# Patient Record
Sex: FEMALE | Race: ASIAN | NOT HISPANIC OR LATINO
[De-identification: names, ages, dates, MRNs, and addresses within clinical notes are randomized per-mention and may not be internally consistent; named-entity substitution may affect disease eponyms.]

---

## 2020-08-19 PROBLEM — Z00.00 ENCOUNTER FOR PREVENTIVE HEALTH EXAMINATION: Status: ACTIVE | Noted: 2020-08-19

## 2020-10-01 ENCOUNTER — NON-APPOINTMENT (OUTPATIENT)
Age: 55
End: 2020-10-01

## 2023-10-25 ENCOUNTER — INPATIENT (INPATIENT)
Facility: HOSPITAL | Age: 58
LOS: 1 days | Discharge: HOME | DRG: 303 | End: 2023-10-27
Attending: HOSPITALIST | Admitting: HOSPITALIST
Payer: COMMERCIAL

## 2023-10-25 VITALS
TEMPERATURE: 98 F | HEART RATE: 69 BPM | WEIGHT: 160.06 LBS | DIASTOLIC BLOOD PRESSURE: 80 MMHG | HEIGHT: 61 IN | OXYGEN SATURATION: 97 % | RESPIRATION RATE: 18 BRPM | SYSTOLIC BLOOD PRESSURE: 155 MMHG

## 2023-10-25 DIAGNOSIS — R94.31 ABNORMAL ELECTROCARDIOGRAM [ECG] [EKG]: ICD-10-CM

## 2023-10-25 DIAGNOSIS — E78.5 HYPERLIPIDEMIA, UNSPECIFIED: ICD-10-CM

## 2023-10-25 DIAGNOSIS — Z29.9 ENCOUNTER FOR PROPHYLACTIC MEASURES, UNSPECIFIED: ICD-10-CM

## 2023-10-25 DIAGNOSIS — R42 DIZZINESS AND GIDDINESS: ICD-10-CM

## 2023-10-25 DIAGNOSIS — E04.1 NONTOXIC SINGLE THYROID NODULE: ICD-10-CM

## 2023-10-25 DIAGNOSIS — R07.89 OTHER CHEST PAIN: ICD-10-CM

## 2023-10-25 LAB
A1C WITH ESTIMATED AVERAGE GLUCOSE RESULT: 5.5 % — SIGNIFICANT CHANGE UP (ref 4–5.6)
A1C WITH ESTIMATED AVERAGE GLUCOSE RESULT: 5.5 % — SIGNIFICANT CHANGE UP (ref 4–5.6)
ALBUMIN SERPL ELPH-MCNC: 4.4 G/DL — SIGNIFICANT CHANGE UP (ref 3.3–5)
ALBUMIN SERPL ELPH-MCNC: 4.4 G/DL — SIGNIFICANT CHANGE UP (ref 3.3–5)
ALP SERPL-CCNC: 67 U/L — SIGNIFICANT CHANGE UP (ref 40–120)
ALP SERPL-CCNC: 67 U/L — SIGNIFICANT CHANGE UP (ref 40–120)
ALT FLD-CCNC: 15 U/L — SIGNIFICANT CHANGE UP (ref 10–45)
ALT FLD-CCNC: 15 U/L — SIGNIFICANT CHANGE UP (ref 10–45)
ANION GAP SERPL CALC-SCNC: 12 MMOL/L — SIGNIFICANT CHANGE UP (ref 5–17)
ANION GAP SERPL CALC-SCNC: 12 MMOL/L — SIGNIFICANT CHANGE UP (ref 5–17)
APTT BLD: 31.1 SEC — SIGNIFICANT CHANGE UP (ref 24.5–35.6)
APTT BLD: 31.1 SEC — SIGNIFICANT CHANGE UP (ref 24.5–35.6)
AST SERPL-CCNC: 19 U/L — SIGNIFICANT CHANGE UP (ref 10–40)
AST SERPL-CCNC: 19 U/L — SIGNIFICANT CHANGE UP (ref 10–40)
BASE EXCESS BLDV CALC-SCNC: -0.2 MMOL/L — SIGNIFICANT CHANGE UP (ref -2–3)
BASE EXCESS BLDV CALC-SCNC: -0.2 MMOL/L — SIGNIFICANT CHANGE UP (ref -2–3)
BASOPHILS # BLD AUTO: 0 K/UL — SIGNIFICANT CHANGE UP (ref 0–0.2)
BASOPHILS # BLD AUTO: 0 K/UL — SIGNIFICANT CHANGE UP (ref 0–0.2)
BASOPHILS NFR BLD AUTO: 0 % — SIGNIFICANT CHANGE UP (ref 0–2)
BASOPHILS NFR BLD AUTO: 0 % — SIGNIFICANT CHANGE UP (ref 0–2)
BILIRUB SERPL-MCNC: 0.3 MG/DL — SIGNIFICANT CHANGE UP (ref 0.2–1.2)
BILIRUB SERPL-MCNC: 0.3 MG/DL — SIGNIFICANT CHANGE UP (ref 0.2–1.2)
BUN SERPL-MCNC: 14 MG/DL — SIGNIFICANT CHANGE UP (ref 7–23)
BUN SERPL-MCNC: 14 MG/DL — SIGNIFICANT CHANGE UP (ref 7–23)
CA-I SERPL-SCNC: 1.19 MMOL/L — SIGNIFICANT CHANGE UP (ref 1.15–1.33)
CA-I SERPL-SCNC: 1.19 MMOL/L — SIGNIFICANT CHANGE UP (ref 1.15–1.33)
CALCIUM SERPL-MCNC: 9.5 MG/DL — SIGNIFICANT CHANGE UP (ref 8.4–10.5)
CALCIUM SERPL-MCNC: 9.5 MG/DL — SIGNIFICANT CHANGE UP (ref 8.4–10.5)
CHLORIDE BLDV-SCNC: 105 MMOL/L — SIGNIFICANT CHANGE UP (ref 96–108)
CHLORIDE BLDV-SCNC: 105 MMOL/L — SIGNIFICANT CHANGE UP (ref 96–108)
CHLORIDE SERPL-SCNC: 105 MMOL/L — SIGNIFICANT CHANGE UP (ref 96–108)
CHLORIDE SERPL-SCNC: 105 MMOL/L — SIGNIFICANT CHANGE UP (ref 96–108)
CHOLEST SERPL-MCNC: 149 MG/DL — SIGNIFICANT CHANGE UP
CHOLEST SERPL-MCNC: 149 MG/DL — SIGNIFICANT CHANGE UP
CO2 BLDV-SCNC: 28 MMOL/L — HIGH (ref 22–26)
CO2 BLDV-SCNC: 28 MMOL/L — HIGH (ref 22–26)
CO2 SERPL-SCNC: 23 MMOL/L — SIGNIFICANT CHANGE UP (ref 22–31)
CO2 SERPL-SCNC: 23 MMOL/L — SIGNIFICANT CHANGE UP (ref 22–31)
CREAT SERPL-MCNC: 0.56 MG/DL — SIGNIFICANT CHANGE UP (ref 0.5–1.3)
CREAT SERPL-MCNC: 0.56 MG/DL — SIGNIFICANT CHANGE UP (ref 0.5–1.3)
EGFR: 104 ML/MIN/1.73M2 — SIGNIFICANT CHANGE UP
EGFR: 104 ML/MIN/1.73M2 — SIGNIFICANT CHANGE UP
EOSINOPHIL # BLD AUTO: 0.09 K/UL — SIGNIFICANT CHANGE UP (ref 0–0.5)
EOSINOPHIL # BLD AUTO: 0.09 K/UL — SIGNIFICANT CHANGE UP (ref 0–0.5)
EOSINOPHIL NFR BLD AUTO: 1 % — SIGNIFICANT CHANGE UP (ref 0–6)
EOSINOPHIL NFR BLD AUTO: 1 % — SIGNIFICANT CHANGE UP (ref 0–6)
ESTIMATED AVERAGE GLUCOSE: 111 MG/DL — SIGNIFICANT CHANGE UP (ref 68–114)
ESTIMATED AVERAGE GLUCOSE: 111 MG/DL — SIGNIFICANT CHANGE UP (ref 68–114)
GAS PNL BLDV: 136 MMOL/L — SIGNIFICANT CHANGE UP (ref 136–145)
GAS PNL BLDV: 136 MMOL/L — SIGNIFICANT CHANGE UP (ref 136–145)
GAS PNL BLDV: SIGNIFICANT CHANGE UP
GLUCOSE BLDV-MCNC: 171 MG/DL — HIGH (ref 70–99)
GLUCOSE BLDV-MCNC: 171 MG/DL — HIGH (ref 70–99)
GLUCOSE SERPL-MCNC: 171 MG/DL — HIGH (ref 70–99)
GLUCOSE SERPL-MCNC: 171 MG/DL — HIGH (ref 70–99)
HCO3 BLDV-SCNC: 26 MMOL/L — SIGNIFICANT CHANGE UP (ref 22–29)
HCO3 BLDV-SCNC: 26 MMOL/L — SIGNIFICANT CHANGE UP (ref 22–29)
HCT VFR BLD CALC: 37.4 % — SIGNIFICANT CHANGE UP (ref 34.5–45)
HCT VFR BLD CALC: 37.4 % — SIGNIFICANT CHANGE UP (ref 34.5–45)
HCT VFR BLDA CALC: 38 % — SIGNIFICANT CHANGE UP (ref 34.5–46.5)
HCT VFR BLDA CALC: 38 % — SIGNIFICANT CHANGE UP (ref 34.5–46.5)
HDLC SERPL-MCNC: 31 MG/DL — LOW
HDLC SERPL-MCNC: 31 MG/DL — LOW
HGB BLD CALC-MCNC: 12.6 G/DL — SIGNIFICANT CHANGE UP (ref 11.7–16.1)
HGB BLD CALC-MCNC: 12.6 G/DL — SIGNIFICANT CHANGE UP (ref 11.7–16.1)
HGB BLD-MCNC: 12.1 G/DL — SIGNIFICANT CHANGE UP (ref 11.5–15.5)
HGB BLD-MCNC: 12.1 G/DL — SIGNIFICANT CHANGE UP (ref 11.5–15.5)
INR BLD: 1.16 RATIO — SIGNIFICANT CHANGE UP (ref 0.85–1.18)
INR BLD: 1.16 RATIO — SIGNIFICANT CHANGE UP (ref 0.85–1.18)
LACTATE BLDV-MCNC: 0.8 MMOL/L — SIGNIFICANT CHANGE UP (ref 0.5–2)
LACTATE BLDV-MCNC: 0.8 MMOL/L — SIGNIFICANT CHANGE UP (ref 0.5–2)
LIPID PNL WITH DIRECT LDL SERPL: 99 MG/DL — SIGNIFICANT CHANGE UP
LIPID PNL WITH DIRECT LDL SERPL: 99 MG/DL — SIGNIFICANT CHANGE UP
LYMPHOCYTES # BLD AUTO: 1.83 K/UL — SIGNIFICANT CHANGE UP (ref 1–3.3)
LYMPHOCYTES # BLD AUTO: 1.83 K/UL — SIGNIFICANT CHANGE UP (ref 1–3.3)
LYMPHOCYTES # BLD AUTO: 20 % — SIGNIFICANT CHANGE UP (ref 13–44)
LYMPHOCYTES # BLD AUTO: 20 % — SIGNIFICANT CHANGE UP (ref 13–44)
MAGNESIUM SERPL-MCNC: 2 MG/DL — SIGNIFICANT CHANGE UP (ref 1.6–2.6)
MAGNESIUM SERPL-MCNC: 2 MG/DL — SIGNIFICANT CHANGE UP (ref 1.6–2.6)
MANUAL SMEAR VERIFICATION: SIGNIFICANT CHANGE UP
MANUAL SMEAR VERIFICATION: SIGNIFICANT CHANGE UP
MCHC RBC-ENTMCNC: 30.3 PG — SIGNIFICANT CHANGE UP (ref 27–34)
MCHC RBC-ENTMCNC: 30.3 PG — SIGNIFICANT CHANGE UP (ref 27–34)
MCHC RBC-ENTMCNC: 32.4 GM/DL — SIGNIFICANT CHANGE UP (ref 32–36)
MCHC RBC-ENTMCNC: 32.4 GM/DL — SIGNIFICANT CHANGE UP (ref 32–36)
MCV RBC AUTO: 93.5 FL — SIGNIFICANT CHANGE UP (ref 80–100)
MCV RBC AUTO: 93.5 FL — SIGNIFICANT CHANGE UP (ref 80–100)
MONOCYTES # BLD AUTO: 0.18 K/UL — SIGNIFICANT CHANGE UP (ref 0–0.9)
MONOCYTES # BLD AUTO: 0.18 K/UL — SIGNIFICANT CHANGE UP (ref 0–0.9)
MONOCYTES NFR BLD AUTO: 2 % — SIGNIFICANT CHANGE UP (ref 2–14)
MONOCYTES NFR BLD AUTO: 2 % — SIGNIFICANT CHANGE UP (ref 2–14)
NEUTROPHILS # BLD AUTO: 7.05 K/UL — SIGNIFICANT CHANGE UP (ref 1.8–7.4)
NEUTROPHILS # BLD AUTO: 7.05 K/UL — SIGNIFICANT CHANGE UP (ref 1.8–7.4)
NEUTROPHILS NFR BLD AUTO: 77 % — SIGNIFICANT CHANGE UP (ref 43–77)
NEUTROPHILS NFR BLD AUTO: 77 % — SIGNIFICANT CHANGE UP (ref 43–77)
NON HDL CHOLESTEROL: 118 MG/DL — SIGNIFICANT CHANGE UP
NON HDL CHOLESTEROL: 118 MG/DL — SIGNIFICANT CHANGE UP
NRBC # BLD: 0 /100 — SIGNIFICANT CHANGE UP (ref 0–0)
NRBC # BLD: 0 /100 — SIGNIFICANT CHANGE UP (ref 0–0)
PCO2 BLDV: 50 MMHG — HIGH (ref 39–42)
PCO2 BLDV: 50 MMHG — HIGH (ref 39–42)
PH BLDV: 7.33 — SIGNIFICANT CHANGE UP (ref 7.32–7.43)
PH BLDV: 7.33 — SIGNIFICANT CHANGE UP (ref 7.32–7.43)
PHOSPHATE SERPL-MCNC: 2.9 MG/DL — SIGNIFICANT CHANGE UP (ref 2.5–4.5)
PHOSPHATE SERPL-MCNC: 2.9 MG/DL — SIGNIFICANT CHANGE UP (ref 2.5–4.5)
PLAT MORPH BLD: NORMAL — SIGNIFICANT CHANGE UP
PLAT MORPH BLD: NORMAL — SIGNIFICANT CHANGE UP
PLATELET # BLD AUTO: 188 K/UL — SIGNIFICANT CHANGE UP (ref 150–400)
PLATELET # BLD AUTO: 188 K/UL — SIGNIFICANT CHANGE UP (ref 150–400)
PO2 BLDV: 37 MMHG — SIGNIFICANT CHANGE UP (ref 25–45)
PO2 BLDV: 37 MMHG — SIGNIFICANT CHANGE UP (ref 25–45)
POTASSIUM BLDV-SCNC: 3.5 MMOL/L — SIGNIFICANT CHANGE UP (ref 3.5–5.1)
POTASSIUM BLDV-SCNC: 3.5 MMOL/L — SIGNIFICANT CHANGE UP (ref 3.5–5.1)
POTASSIUM SERPL-MCNC: 3.6 MMOL/L — SIGNIFICANT CHANGE UP (ref 3.5–5.3)
POTASSIUM SERPL-MCNC: 3.6 MMOL/L — SIGNIFICANT CHANGE UP (ref 3.5–5.3)
POTASSIUM SERPL-SCNC: 3.6 MMOL/L — SIGNIFICANT CHANGE UP (ref 3.5–5.3)
POTASSIUM SERPL-SCNC: 3.6 MMOL/L — SIGNIFICANT CHANGE UP (ref 3.5–5.3)
PROT SERPL-MCNC: 7.3 G/DL — SIGNIFICANT CHANGE UP (ref 6–8.3)
PROT SERPL-MCNC: 7.3 G/DL — SIGNIFICANT CHANGE UP (ref 6–8.3)
PROTHROM AB SERPL-ACNC: 12.1 SEC — SIGNIFICANT CHANGE UP (ref 9.5–13)
PROTHROM AB SERPL-ACNC: 12.1 SEC — SIGNIFICANT CHANGE UP (ref 9.5–13)
RBC # BLD: 4 M/UL — SIGNIFICANT CHANGE UP (ref 3.8–5.2)
RBC # BLD: 4 M/UL — SIGNIFICANT CHANGE UP (ref 3.8–5.2)
RBC # FLD: 14 % — SIGNIFICANT CHANGE UP (ref 10.3–14.5)
RBC # FLD: 14 % — SIGNIFICANT CHANGE UP (ref 10.3–14.5)
RBC BLD AUTO: NORMAL — SIGNIFICANT CHANGE UP
RBC BLD AUTO: NORMAL — SIGNIFICANT CHANGE UP
SAO2 % BLDV: 61.5 % — LOW (ref 67–88)
SAO2 % BLDV: 61.5 % — LOW (ref 67–88)
SODIUM SERPL-SCNC: 140 MMOL/L — SIGNIFICANT CHANGE UP (ref 135–145)
SODIUM SERPL-SCNC: 140 MMOL/L — SIGNIFICANT CHANGE UP (ref 135–145)
TRIGL SERPL-MCNC: 99 MG/DL — SIGNIFICANT CHANGE UP
TRIGL SERPL-MCNC: 99 MG/DL — SIGNIFICANT CHANGE UP
TROPONIN T, HIGH SENSITIVITY RESULT: 15 NG/L — SIGNIFICANT CHANGE UP (ref 0–51)
TROPONIN T, HIGH SENSITIVITY RESULT: 15 NG/L — SIGNIFICANT CHANGE UP (ref 0–51)
TROPONIN T, HIGH SENSITIVITY RESULT: 17 NG/L — SIGNIFICANT CHANGE UP (ref 0–51)
TROPONIN T, HIGH SENSITIVITY RESULT: 17 NG/L — SIGNIFICANT CHANGE UP (ref 0–51)
WBC # BLD: 9.16 K/UL — SIGNIFICANT CHANGE UP (ref 3.8–10.5)
WBC # BLD: 9.16 K/UL — SIGNIFICANT CHANGE UP (ref 3.8–10.5)
WBC # FLD AUTO: 9.16 K/UL — SIGNIFICANT CHANGE UP (ref 3.8–10.5)
WBC # FLD AUTO: 9.16 K/UL — SIGNIFICANT CHANGE UP (ref 3.8–10.5)

## 2023-10-25 PROCEDURE — 93306 TTE W/DOPPLER COMPLETE: CPT | Mod: 26

## 2023-10-25 PROCEDURE — 71045 X-RAY EXAM CHEST 1 VIEW: CPT | Mod: 26

## 2023-10-25 PROCEDURE — 70450 CT HEAD/BRAIN W/O DYE: CPT | Mod: 26,MA,59

## 2023-10-25 PROCEDURE — 76536 US EXAM OF HEAD AND NECK: CPT | Mod: 26

## 2023-10-25 PROCEDURE — 99223 1ST HOSP IP/OBS HIGH 75: CPT

## 2023-10-25 PROCEDURE — 70496 CT ANGIOGRAPHY HEAD: CPT | Mod: 26,MA

## 2023-10-25 PROCEDURE — 70498 CT ANGIOGRAPHY NECK: CPT | Mod: 26,MA

## 2023-10-25 PROCEDURE — 99285 EMERGENCY DEPT VISIT HI MDM: CPT | Mod: 25

## 2023-10-25 RX ORDER — CHOLECALCIFEROL (VITAMIN D3) 125 MCG
1 CAPSULE ORAL
Refills: 0 | DISCHARGE

## 2023-10-25 RX ORDER — ATORVASTATIN CALCIUM 80 MG/1
20 TABLET, FILM COATED ORAL AT BEDTIME
Refills: 0 | Status: DISCONTINUED | OUTPATIENT
Start: 2023-10-25 | End: 2023-10-27

## 2023-10-25 RX ORDER — MECLIZINE HCL 12.5 MG
12.5 TABLET ORAL ONCE
Refills: 0 | Status: COMPLETED | OUTPATIENT
Start: 2023-10-25 | End: 2023-10-25

## 2023-10-25 RX ORDER — ASPIRIN/CALCIUM CARB/MAGNESIUM 324 MG
1 TABLET ORAL
Refills: 0 | DISCHARGE

## 2023-10-25 RX ORDER — LANOLIN ALCOHOL/MO/W.PET/CERES
3 CREAM (GRAM) TOPICAL AT BEDTIME
Refills: 0 | Status: DISCONTINUED | OUTPATIENT
Start: 2023-10-25 | End: 2023-10-27

## 2023-10-25 RX ORDER — METOCLOPRAMIDE HCL 10 MG
10 TABLET ORAL ONCE
Refills: 0 | Status: COMPLETED | OUTPATIENT
Start: 2023-10-25 | End: 2023-10-25

## 2023-10-25 RX ORDER — NABUMETONE 750 MG
1 TABLET ORAL
Refills: 0 | DISCHARGE

## 2023-10-25 RX ORDER — ASPIRIN/CALCIUM CARB/MAGNESIUM 324 MG
81 TABLET ORAL DAILY
Refills: 0 | Status: DISCONTINUED | OUTPATIENT
Start: 2023-10-25 | End: 2023-10-27

## 2023-10-25 RX ORDER — MECLIZINE HCL 12.5 MG
12.5 TABLET ORAL EVERY 8 HOURS
Refills: 0 | Status: DISCONTINUED | OUTPATIENT
Start: 2023-10-25 | End: 2023-10-27

## 2023-10-25 RX ORDER — ACETAMINOPHEN 500 MG
650 TABLET ORAL EVERY 6 HOURS
Refills: 0 | Status: DISCONTINUED | OUTPATIENT
Start: 2023-10-25 | End: 2023-10-27

## 2023-10-25 RX ADMIN — Medication 12.5 MILLIGRAM(S): at 11:24

## 2023-10-25 RX ADMIN — ATORVASTATIN CALCIUM 20 MILLIGRAM(S): 80 TABLET, FILM COATED ORAL at 22:51

## 2023-10-25 RX ADMIN — Medication 81 MILLIGRAM(S): at 12:33

## 2023-10-25 RX ADMIN — Medication 10 MILLIGRAM(S): at 04:01

## 2023-10-25 NOTE — ED ADULT NURSE REASSESSMENT NOTE - NS ED NURSE REASSESS COMMENT FT1
PT on stretcher with family at bedside. Offered meclizine and breakfast, pt does not want at this time.  Vital signs stable.  Pt updated on plan of care.  Awaiting bed placement.

## 2023-10-25 NOTE — ED PROVIDER NOTE - OBJECTIVE STATEMENT
Patient is a 58 year-old-female with history of HTN and HLD presents as a code stroke for dizziness. Accompanied by daughter. Reports that the patient called the daughter at 1 in the AM and complained of dizziness with headache and vomited x2. Also c/o overall weakness. +chest tightness. Denies fever at home, abdominal pain, urinary or bowel complaints.

## 2023-10-25 NOTE — ED PROVIDER NOTE - PROGRESS NOTE DETAILS
Uday PGY3  Discussed with neuro - recommends medicine admission for further metabolic workup.   Reassessed patient and reports feeling better. TM clear bilaterally. Uday PGY3  Discussed with hospitalist - will admit for further cardiac workup given abnormal ekg and chest tightness on presentation.

## 2023-10-25 NOTE — ED ADULT NURSE NOTE - OBJECTIVE STATEMENT
58y Female presents to the ED c/o fatigue, vomiting, and inability to stand. PMH HLD. Pt is primarily Hebrew speaking, Pts daughter at bedside translating for Pt. Pts daughter states around 12AM her Mother began vomiting, having fatigue, and was unable to stand up. Pt called her daughter at 1AM explaining symptoms and Pt was brought to the ED for an evaluation. Pts daughter states her Mother was leaning more to the R side and "wasn't speaking properly". Pt is A&Ox4, and ambulatory at baseline. Respirations spontaneous, unlabored, and equal bilaterally. Patient safety maintained, bed is in lowest position, wheels locked, and side rails raised. Patient oriented to call bell, and call bell is within reach. 58y Female presents to the ED c/o fatigue, vomiting, and inability to stand. Code stroke called at 0323. PMH HLD. Pt is primarily Welsh speaking, Pts daughter at bedside translating for Pt. Pts daughter states around 12AM her Mother began vomiting, having fatigue, and was unable to stand up. Pt called her daughter at 1AM explaining symptoms and Pt was brought to the ED for an evaluation. Pts daughter states her Mother was leaning more to the R side and "wasn't speaking properly". Pt is A&Ox4, and ambulatory at baseline. WARNER, lungs clear, distal pulses intact, abdomen soft, skin intact. Side rails up for safety, call bell and personal items within reach, instructed to call for assistance, verbalizes understanding.

## 2023-10-25 NOTE — ED ADULT NURSE NOTE - CHIEF COMPLAINT QUOTE
dizziness, headache and vomiting x2 that started at 1130pm last night. took 2 aspirins before coming into the ED

## 2023-10-25 NOTE — CONSULT NOTE ADULT - ASSESSMENT
58y (1965) woman with a past medical history of DL presenting for acute onset dizziness, generalized weakness, headache, bilateral upper extremity burning radiating from the neck to the hands, all happen suddenly at about 9 pm on Oct 24/23. Her presentation was as well concomitant with nausea and vomiting. She otherwise denied any abdominal pain or diarrhea. She denied any spinning sensation. She denied any focal loss of sensation or focal weakness aside form the generalized weakness.     impression: generalized altered status without focality likely due to metabolic disturbance    plan:  [] admit the patient to medicine  [] perform full metabolic work up  [] no focal deficit  [] neurology to follow only if needed     58y (1965) woman with a past medical history of DL presenting for acute onset dizziness, generalized weakness, headache, bilateral upper extremity burning radiating from the neck to the hands, all happen suddenly at about 9 pm on Oct 24/23. Her presentation was as well concomitant with nausea and vomiting. She otherwise denied any abdominal pain or diarrhea. She denied any spinning sensation. She denied any focal loss of sensation or focal weakness aside form the generalized weakness.     impression: generalized altered status without focality likely due to metabolic disturbance    plan:  - metabolic workup     see attesstation for final recs

## 2023-10-25 NOTE — CONSULT NOTE ADULT - SUBJECTIVE AND OBJECTIVE BOX
DATE OF SERVICE: 10-25-23 @ 18:40    CHIEF COMPLAINT:Patient is a 58y old  Female who presents with a chief complaint of dizziness (25 Oct 2023 12:59)      HISTORY OF PRESENT ILLNESS:HPI:  58 Yoruba female with carpal tunnel syndrome, HLD, no prior hx of stroke or MI here for 1 day history of dizziness, with nausea and 3 episodes of emesis associated with chest tightness and weakness. She describes chronic episodic bilateral hand pain/cramping that preceded the nausea. In the ED, pt was called for code stroke, with negative CT angio and CTH findings. Her symptoms largely resolved after meclizine but still with some bilateral hand weakness/numbness.        PAST MEDICAL & SURGICAL HISTORY:  HLD (hyperlipidemia)      No significant past surgical history      MEDICATIONS:  aspirin enteric coated 81 milliGRAM(s) Oral daily        acetaminophen     Tablet .. 650 milliGRAM(s) Oral every 6 hours PRN  meclizine 12.5 milliGRAM(s) Oral every 8 hours PRN  melatonin 3 milliGRAM(s) Oral at bedtime PRN      atorvastatin 20 milliGRAM(s) Oral at bedtime        FAMILY HISTORY:  No pertinent family history in first degree relatives        Non-contributory    SOCIAL HISTORY:    No Tobacco use      Allergies    No Known Allergies    Intolerances    	    REVIEW OF SYSTEMS:  CONSTITUTIONAL: No fever  EYES: No eye pain, visual disturbances, or discharge  ENMT:  No difficulty hearing, tinnitus  NECK: No pain or stiffness  RESPIRATORY: No cough, wheezing,  CARDIOVASCULAR: No chest pain, palpitations, passing out, dizziness, or leg swelling  GASTROINTESTINAL:  No nausea, vomiting, diarrhea or constipation. No melena.  GENITOURINARY: No dysuria, hematuria  NEUROLOGICAL: No stroke like symptoms  SKIN: No burning or lesions   ENDOCRINE: No heat or cold intolerance  MUSCULOSKELETAL: No joint pain or swelling  PSYCHIATRIC: No  anxiety, mood swings  HEME/LYMPH: No bleeding gums  ALLERGY AND IMMUNOLOGIC: No hives or eczema	    All other ROS negative    PHYSICAL EXAM:  T(C): 36.6 (10-25-23 @ 16:10), Max: 36.7 (10-25-23 @ 04:42)  HR: 64 (10-25-23 @ 16:10) (63 - 70)  BP: 141/88 (10-25-23 @ 16:10) (114/78 - 155/80)  RR: 18 (10-25-23 @ 16:10) (17 - 20)  SpO2: 94% (10-25-23 @ 16:10) (94% - 99%)  Wt(kg): --  I&O's Summary      Appearance: Normal	  HEENT:   Normal oral mucosa, EOMI	  Cardiovascular:  S1 S2, No JVD,    Respiratory: Lungs clear to auscultation	  Psychiatry: Alert  Gastrointestinal:  Soft, Non-tender, + BS	  Skin: No rashes   Neurologic: Non-focal  Extremities:  No edema  Vascular: Peripheral pulses palpable    	    	  	  CARDIAC MARKERS:  Labs personally reviewed by me                                  12.1   9.16  )-----------( 188      ( 25 Oct 2023 03:36 )             37.4     10-25    140  |  105  |  14  ----------------------------<  171<H>  3.6   |  23  |  0.56    Ca    9.5      25 Oct 2023 03:36  Phos  2.9     10-25  Mg     2.0     10-25    TPro  7.3  /  Alb  4.4  /  TBili  0.3  /  DBili  x   /  AST  19  /  ALT  15  /  AlkPhos  67  10-25          EKG: Personally reviewed by me - no EKG found in EMR (HPI comments on diffused TWI pending review)   Radiology: Personally reviewed by me -  Clear lungs.    Echo 10/25/23   1. Left ventricular cavity is normal. Left ventricular wall thickness is normal. Left ventricular systolic function is normal with an ejection fraction of 75 % by Carlos's method of disks. There are no regional wall motion abnormalities seen.   2. Normal left ventricular diastolic function, with normal filling pressure.   3. Normal right ventricular cavity size, wall thickness, and systolic function.   4. No pericardial effusion seen.   5. No prior echocardiogram is available for comparison.      CT head  Patent intracranial circulation without flow limiting stenosis.  No evidence of aneurysm.        Assessment /Plan:     58 Yoruba female with carpal tunnel syndrome, HLD, no prior hx of stroke or MI here for 1 day history of dizziness, with nausea and 3 episodes of emesis associated with chest tightness and weakness. She describes chronic episodic bilateral hand pain/cramping that preceded the nausea. In the ED, pt was called for code stroke, with negative CT angio and CTH findings. Her symptoms largely resolved after meclizine but still with some bilateral hand weakness/numbness.      1 - Abnormal ECG  - Will review EKG, recommend repeat EKG   - Troponin wnl x2   - echo wnl  - recommend CTA Heart to further define coronary anatomy     2- Dizziness   - Neuro consult appreciated  - check orthostatic   - consider check carotid U/S   -CT head as above normal     3- HLD   - LDL 99 wnl   -c/w Atorvastatin 20mg          Differential diagnosis and plan of care discussed with patient after the evaluation. Counseling on diet, nutritional counseling, weight management, exercise and medication compliance was done.   Advanced care planning/advanced directives discussed with patient/family. DNR status including forceful chest compressions to attempt to restart the heart, ventilator support/artificial breathing, electric shock, artificial nutrition, health care proxy, Molst form all discussed with pt. Pt wishes to consider. More than fifteen minutes spent on discussing advanced directives.     JEFFREY Mercado, DO [facility]  Cardiovascular Medicine  [address], [address]  Available for call or text via [employer]  Office [phone]   DATE OF SERVICE: 10-25-23 @ 18:40    CHIEF COMPLAINT:Patient is a 58y old  Female who presents with a chief complaint of dizziness (25 Oct 2023 12:59)      HISTORY OF PRESENT ILLNESS:HPI:  58 Italian female with carpal tunnel syndrome, HLD, no prior hx of stroke or MI here for 1 day history of dizziness, with nausea and 3 episodes of emesis associated with chest tightness and weakness. She describes chronic episodic bilateral hand pain/cramping that preceded the nausea. In the ED, pt was called for code stroke, with negative CT angio and CTH findings. Her symptoms largely resolved after meclizine but still with some bilateral hand weakness/numbness.        PAST MEDICAL & SURGICAL HISTORY:  HLD (hyperlipidemia)      No significant past surgical history      MEDICATIONS:  aspirin enteric coated 81 milliGRAM(s) Oral daily        acetaminophen     Tablet .. 650 milliGRAM(s) Oral every 6 hours PRN  meclizine 12.5 milliGRAM(s) Oral every 8 hours PRN  melatonin 3 milliGRAM(s) Oral at bedtime PRN      atorvastatin 20 milliGRAM(s) Oral at bedtime        FAMILY HISTORY:  No pertinent family history in first degree relatives        Non-contributory    SOCIAL HISTORY:    No Tobacco use      Allergies    No Known Allergies    Intolerances    	    REVIEW OF SYSTEMS:  CONSTITUTIONAL: No fever  EYES: No eye pain, visual disturbances, or discharge  ENMT:  No difficulty hearing, tinnitus  NECK: No pain or stiffness  RESPIRATORY: No cough, wheezing,  CARDIOVASCULAR: No chest pain, palpitations, passing out, dizziness, or leg swelling  GASTROINTESTINAL:  No nausea, vomiting, diarrhea or constipation. No melena.  GENITOURINARY: No dysuria, hematuria  NEUROLOGICAL: No stroke like symptoms  SKIN: No burning or lesions   ENDOCRINE: No heat or cold intolerance  MUSCULOSKELETAL: No joint pain or swelling  PSYCHIATRIC: No  anxiety, mood swings  HEME/LYMPH: No bleeding gums  ALLERGY AND IMMUNOLOGIC: No hives or eczema	    All other ROS negative    PHYSICAL EXAM:  T(C): 36.6 (10-25-23 @ 16:10), Max: 36.7 (10-25-23 @ 04:42)  HR: 64 (10-25-23 @ 16:10) (63 - 70)  BP: 141/88 (10-25-23 @ 16:10) (114/78 - 155/80)  RR: 18 (10-25-23 @ 16:10) (17 - 20)  SpO2: 94% (10-25-23 @ 16:10) (94% - 99%)  Wt(kg): --  I&O's Summary      Appearance: Normal	  HEENT:   Normal oral mucosa, EOMI	  Cardiovascular:  S1 S2, No JVD,    Respiratory: Lungs clear to auscultation	  Psychiatry: Alert  Gastrointestinal:  Soft, Non-tender, + BS	  Skin: No rashes   Neurologic: Non-focal  Extremities:  No edema  Vascular: Peripheral pulses palpable    	    	  	  CARDIAC MARKERS:  Labs personally reviewed by me                                  12.1   9.16  )-----------( 188      ( 25 Oct 2023 03:36 )             37.4     10-25    140  |  105  |  14  ----------------------------<  171<H>  3.6   |  23  |  0.56    Ca    9.5      25 Oct 2023 03:36  Phos  2.9     10-25  Mg     2.0     10-25    TPro  7.3  /  Alb  4.4  /  TBili  0.3  /  DBili  x   /  AST  19  /  ALT  15  /  AlkPhos  67  10-25          EKG: Personally reviewed by me - no EKG found in EMR (HPI comments on diffused TWI pending review)   Radiology: Personally reviewed by me -  Clear lungs.    Echo 10/25/23   1. Left ventricular cavity is normal. Left ventricular wall thickness is normal. Left ventricular systolic function is normal with an ejection fraction of 75 % by Carlos's method of disks. There are no regional wall motion abnormalities seen.   2. Normal left ventricular diastolic function, with normal filling pressure.   3. Normal right ventricular cavity size, wall thickness, and systolic function.   4. No pericardial effusion seen.   5. No prior echocardiogram is available for comparison.      CT head  Patent intracranial circulation without flow limiting stenosis.  No evidence of aneurysm.        Assessment /Plan:     58 Italian female with carpal tunnel syndrome, HLD, no prior hx of stroke or MI here for 1 day history of dizziness, with nausea and 3 episodes of emesis associated with chest tightness and weakness. She describes chronic episodic bilateral hand pain/cramping that preceded the nausea. In the ED, pt was called for code stroke, with negative CT angio and CTH findings. Her symptoms largely resolved after meclizine but still with some bilateral hand weakness/numbness.      1 Chest pain with Abnormal ECG  - EKG with ischemic changes  - Troponin wnl x2   - echo wnl  - recommend CTA Heart to further define coronary anatomy     2- Dizziness   - Neuro consult appreciated  - check orthostatic   - consider check carotid U/S   -CT head as above normal     3- HLD   - LDL 99 wnl   -c/w Atorvastatin 20mg        Discussed with daughter at bedside         Differential diagnosis and plan of care discussed with patient after the evaluation. Counseling on diet, nutritional counseling, weight management, exercise and medication compliance was done.   Advanced care planning/advanced directives discussed with patient/family. DNR status including forceful chest compressions to attempt to restart the heart, ventilator support/artificial breathing, electric shock, artificial nutrition, health care proxy, Molst form all discussed with pt. Pt wishes to consider. More than fifteen minutes spent on discussing advanced directives.     JEFFREY Mercado, DO [facility]  Cardiovascular Medicine  [address] [place] [address] Dr, [address]  Available for call or text via [employer]  Office [phone]

## 2023-10-25 NOTE — H&P ADULT - PROBLEM SELECTOR PLAN 3
Incidental 1.2cm R thyroid nodule seen on CT  - thyroid ultrasound for risk stratification -can be done inpatient or outpatient  - f/u TSH

## 2023-10-25 NOTE — H&P ADULT - NSHPREVIEWOFSYSTEMS_GEN_ALL_CORE
REVIEW OF SYSTEMS:  CONSTITUTIONAL: No fever, chills, night sweats, or fatigue  EYES: No eye pain, visual disturbances, or discharge  ENMT:  No difficulty hearing, tinnitus, vertigo; No sinus or throat pain  NECK: No pain or stiffness  RESPIRATORY: No cough, wheezing, or hemoptysis; No shortness of breath  CARDIOVASCULAR: No chest pain, palpitations, dizziness, or leg swelling  GASTROINTESTINAL: No abdominal or epigastric pain. No nausea, vomiting, or hematemesis; No diarrhea or constipation. No melena or hematochezia.  GENITOURINARY: No dysuria, frequency, hematuria, or incontinence  NEUROLOGICAL: No headaches, memory loss, loss of strength, numbness, or tremors  SKIN: No itching, burning, rashes, or lesions   LYMPH NODES: No enlarged glands  ENDOCRINE: No heat or cold intolerance; No hair loss  MUSCULOSKELETAL: No joint pain or swelling; No muscle, back, or extremity pain  PSYCHIATRIC: No depression, anxiety, mood swings, or difficulty sleeping  HEME/LYMPH: No easy bruising, or bleeding gums  ALLERGY AND IMMUNOLOGIC: No hives or eczema REVIEW OF SYSTEMS:  CONSTITUTIONAL: No fever, chills  EYES: No eye pain, vision changes  ENMT:  No difficulty hearing, tinnitus, vertigo  NECK: No pain or stiffness  RESPIRATORY: No cough, wheezing, sob  CARDIOVASCULAR: No chest pain, palpitations, dizziness, or leg swelling  GASTROINTESTINAL: +nausea/emesis. No abdominal or epigastric pain. No hematemesis; No diarrhea or constipation. No melena or hematochezia.  GENITOURINARY: No dysuria, frequency  NEUROLOGICAL: +b/l hand numbness/weakness. No headaches  SKIN: No itching, burning, rashes, or lesions   LYMPH NODES: No enlarged glands  ENDOCRINE: No heat or cold intolerance; No hair loss  MUSCULOSKELETAL: b/l knee pain  HEME/LYMPH: No easy bruising, or bleeding gums  ALLERGY AND IMMUNOLOGIC: No hives or eczema

## 2023-10-25 NOTE — H&P ADULT - NSHPLABSRESULTS_GEN_ALL_CORE
12.1   9.16  )-----------( 188      ( 25 Oct 2023 03:36 )             37.4     10-25    140  |  105  |  14  ----------------------------<  171<H>  3.6   |  23  |  0.56    Ca    9.5      25 Oct 2023 03:36  Phos  2.9     10-25  Mg     2.0     10-25    TPro  7.3  /  Alb  4.4  /  TBili  0.3  /  DBili  x   /  AST  19  /  ALT  15  /  AlkPhos  67  10-25    PT/INR - ( 25 Oct 2023 03:36 )   PT: 12.1 sec;   INR: 1.16 ratio         PTT - ( 25 Oct 2023 03:36 )  PTT:31.1 sec    Venous: 10-25-23 @ 03:36 FiO2: -- Oxygen Sat% 61.5    Urinalysis Basic - ( 25 Oct 2023 03:36 )    Color: x / Appearance: x / SG: x / pH: x  Gluc: 171 mg/dL / Ketone: x  / Bili: x / Urobili: x   Blood: x / Protein: x / Nitrite: x   Leuk Esterase: x / RBC: x / WBC x   Sq Epi: x / Non Sq Epi: x / Bacteria: x    CAPILLARY BLOOD GLUCOSE    POCT Blood Glucose.: 175 mg/dL (25 Oct 2023 03:24)    EKG with diffuse TWI, prolonged QTc

## 2023-10-25 NOTE — ED PROVIDER NOTE - CLINICAL SUMMARY MEDICAL DECISION MAKING FREE TEXT BOX
Patient is a 58 year-old-female with history of HTN and HLD presents as a code stroke for dizziness with headache/vomiting/chest tightness/weakness. DDx includes infectious vs metabolic vs cardiac etiologies. Low suspicion for stroke but will pursue CT head/neck imaging per neuro. Workup includes labs, UA/UC, ECG, CXR, CTA head/neck. Disposition pending workup.

## 2023-10-25 NOTE — ED ADULT TRIAGE NOTE - CHIEF COMPLAINT QUOTE
dizziness, headache and vomiting x2 that started at 1130pm last night dizziness, headache and vomiting x2 that started at 1130pm last night. took 2 aspirins before coming into the ED

## 2023-10-25 NOTE — H&P ADULT - NSHPPHYSICALEXAM_GEN_ALL_CORE
Vital Signs Last 24 Hrs  T(C): 36.5 (25 Oct 2023 11:00), Max: 36.7 (25 Oct 2023 04:42)  T(F): 97.7 (25 Oct 2023 11:00), Max: 98.1 (25 Oct 2023 04:42)  HR: 63 (25 Oct 2023 11:00) (63 - 70)  BP: 114/78 (25 Oct 2023 11:00) (114/78 - 155/80)  RR: 20 (25 Oct 2023 11:00) (17 - 20)  SpO2: 96% (25 Oct 2023 11:00) (95% - 99%)    Parameters below as of 25 Oct 2023 11:00  Patient On (Oxygen Delivery Method): room air    GENERAL: NAD, well appearing  EYES: EOMI, PERRL, conjunctiva and sclera clear  NECK: Supple, No JVD  CHEST/LUNG: Clear to auscultation bilaterally; No wheeze  HEART: Regular rate and rhythm; No murmurs, rubs, or gallops  ABDOMEN: Soft, Nontender, Nondistended; Bowel sounds present  EXTREMITIES:  2+ Peripheral Pulses, No clubbing, cyanosis, or edema  PSYCH: AAOx3, appropriate affect  NEUROLOGY: symmetric 5/5 strength upper and lower ext flexors and extensors, intact symmetric sensation to sharp stimuli all extremities, no focal neurologic deficit, CN 2-12 grossly intact  SKIN: No rashes or lesions

## 2023-10-25 NOTE — ED PROVIDER NOTE - PHYSICAL EXAMINATION
Vitals: I have reviewed the patients vital signs  General: not in acute resp distress   HEENT: Atraumatic, normocephalic, airway patent, no facial droop noted   Eyes: EOMI, tracking appropriately  Neck: no tracheal deviation, no JVD  Chest/Lungs: no trauma, symmetric chest rise, speaking in complete sentences, no WOB, CTA BL  Heart: skin and extremities well perfused, regular rate and rhythm, no LE edema/swelling, no murmur   Abdomen: soft, nontender and nondistended   Neuro: A+Ox3, ambulating without difficulty, CN II-XI intact  MSK: 5/5 strength and normal sensation in all 4 extremities   Skin: no cyanosis, no jaundice, no new emergent lesions

## 2023-10-25 NOTE — ED PROVIDER NOTE - CARE PLAN
1 Principal Discharge DX:	Chest tightness  Secondary Diagnosis:	Abnormal ECG  Secondary Diagnosis:	Dizziness  Secondary Diagnosis:	Nausea & vomiting

## 2023-10-25 NOTE — H&P ADULT - TIME BILLING
The necessity of the time spent during the encounter on this date of service was due to:   - Ordering, reviewing, and interpreting labs, testing, and imaging  - Independently obtaining a review of systems and performing a physical exam  - Reviewing prior hospitalization and where necessary, outpatient records  - Reviewing consultant recommendations/communicating with consultants  - Counselling and educating patient and family regarding interpretation of aforementioned items and plan of care

## 2023-10-25 NOTE — H&P ADULT - PROBLEM SELECTOR PLAN 1
Thought to be 2/2 metabolic disturbance   Neurology consulted in ED, recs appreciated  - meclizine PRN Thought to be 2/2 metabolic disturbance  - Neurology consulted in ED, recs appreciated  - meclizine PRN  - MRI brain/c spine if symptoms return  - f/u a1c, lipid panel

## 2023-10-25 NOTE — CONSULT NOTE ADULT - SUBJECTIVE AND OBJECTIVE BOX
Neurology - Consult Note    -  Spectra: [phone] ([facility]), [phone] ([facility])  -    HPI: Patient JAMES ALONSO is a 58y (1965) woman with a past medical history of DL presenting for acute onset     Review of Systems:  INCOMPLETE   CONSTITUTIONAL: No fevers or chills  EYES AND ENT: No visual changes or no throat pain   NECK: No pain or stiffness  RESPIRATORY: No hemoptysis or shortness of breath  CARDIOVASCULAR: No chest pain or palpitations  GASTROINTESTINAL: No melena or hematochezia  GENITOURINARY: No dysuria or hematuria  NEUROLOGICAL: +As stated in HPI above  SKIN: No itching, burning, rashes, or lesions   All other review of systems is negative unless indicated above.    Allergies:  No Known Allergies      PMHx/PSHx/Family Hx: As above, otherwise see below       Social Hx:  No current use of tobacco, alcohol, or illicit drugs  Lives with ***    Medications:  MEDICATIONS  (STANDING):  meclizine 12.5 milliGRAM(s) Oral Once    MEDICATIONS  (PRN):      Vitals:  T(C): 36.7 (10-25-23 @ 04:42), Max: 36.7 (10-25-23 @ 04:42)  HR: 66 (10-25-23 @ 04:42) (66 - 70)  BP: 124/76 (10-25-23 @ 04:42) (124/76 - 155/80)  RR: 18 (10-25-23 @ 04:42) (18 - 20)  SpO2: 95% (10-25-23 @ 04:42) (95% - 97%)    Physical Examination: INCOMPLETE  General - NAD  Cardiovascular - Peripheral pulses palpable, no edema  Eyes - Fundoscopy with flat, sharp optic discs and no hemorrhage or exudates; Fundoscopy not well visualized; Fundoscopy not performed due to safety precautions in the setting of infection risk    Neurologic Exam:  Mental status - Awake, Alert, Oriented to person, place, and time. Speech fluent, repetition and naming intact. Follows simple and complex commands. Attention/concentration, recent and remote memory (including registration and recall), and fund of knowledge intact    Cranial nerves - PERRLA, VFF, EOMI, face sensation (V1-V3) intact b/l, facial strength intact without asymmetry b/l, hearing intact b/l, palate with symmetric elevation, trapezius OR sternocleidomastoid 5/5 strength b/l, tongue midline on protrusion with full lateral movement    Motor - Normal bulk and tone throughout. No pronator drift.  Strength testing            Deltoid      Biceps      Triceps     Wrist Extension    Wrist Flexion     Interossei         R            5                 5               5                     5                              5                        5                 5  L             5                 5               5                     5                              5                        5                 5              Hip Flexion    Hip Extension    Knee Flexion    Knee Extension    Dorsiflexion    Plantar Flexion  R              5                           5                       5                           5                            5                          5  L              5                           5                        5                           5                            5                          5    Sensation - Light touch/temperature OR pain/vibration intact throughout    DTR's -             Biceps      Triceps     Brachioradialis      Patellar    Ankle    Toes/plantar response  R             2+             2+                  2+                       2+            2+                 Down  L              2+             2+                 2+                        2+           2+                 Down    Coordination - Finger to Nose intact b/l. No tremors appreciated    Gait and station - Normal casual gait. Romberg (-)    Labs:                        12.1   9.16  )-----------( 188      ( 25 Oct 2023 03:36 )             37.4     10-25    140  |  105  |  14  ----------------------------<  171<H>  3.6   |  23  |  0.56    Ca    9.5      25 Oct 2023 03:36    TPro  7.3  /  Alb  4.4  /  TBili  0.3  /  DBili  x   /  AST  19  /  ALT  15  /  AlkPhos  67  10-25    CAPILLARY BLOOD GLUCOSE      POCT Blood Glucose.: 175 mg/dL (25 Oct 2023 03:24)    LIVER FUNCTIONS - ( 25 Oct 2023 03:36 )  Alb: 4.4 g/dL / Pro: 7.3 g/dL / ALK PHOS: 67 U/L / ALT: 15 U/L / AST: 19 U/L / GGT: x             PT/INR - ( 25 Oct 2023 03:36 )   PT: 12.1 sec;   INR: 1.16 ratio         PTT - ( 25 Oct 2023 03:36 )  PTT:31.1 sec  CSF:                  Radiology:     Neurology - Consult Note    -  Spectra: [phone] ([facility]), [phone] ([facility])  -    HPI: Patient JAMES ALONSO is a 58y (1965) woman with a past medical history of DL presenting for acute onset dizziness, generalized weakness, headache, bilateral upper extremity burning radiating from the neck to the hands, all happen suddenly at about 9 pm on Oct 24/23. Her presentation was as well concomitant with nausea and vomiting. She otherwise denied any abdominal pain or diarrhea. She denied any spinning sensation. She denied any focal loss of sensation or focal weakness aside form the generalized weakness.     Review of Systems:    NEUROLOGICAL: +As stated in HPI above  All other review of systems is negative unless indicated above.    Allergies:  No Known Allergies  PMHx/PSHx/Family Hx: As above, otherwise see below     Social Hx:  No current use of tobacco, alcohol, or illicit drugs    Medications:  MEDICATIONS  (STANDING):  meclizine 12.5 milliGRAM(s) Oral Once    MEDICATIONS  (PRN):  Vitals:  T(C): 36.7 (10-25-23 @ 04:42), Max: 36.7 (10-25-23 @ 04:42)  HR: 66 (10-25-23 @ 04:42) (66 - 70)  BP: 124/76 (10-25-23 @ 04:42) (124/76 - 155/80)  RR: 18 (10-25-23 @ 04:42) (18 - 20)  SpO2: 95% (10-25-23 @ 04:42) (95% - 97%)    Physical Examination:     Neurologic Exam:  Mental status - Awake, Alert, Oriented to person, place, and time. Speech fluent, repetition and naming intact. Follows simple and complex commands. Attention/concentration intact    Cranial nerves - PERRLA, VFF, EOMI, face sensation (V1-V3) intact b/l, facial strength intact without asymmetry b/l, hearing intact b/l, palate with symmetric elevation, trapezius OR sternocleidomastoid 5/5 strength b/l, tongue midline on protrusion with full lateral movement    Motor - Normal bulk and tone throughout. No pronator drift.  Strength testing            Deltoid      Biceps      Triceps                 R            5                 5               5                     5                                                       L             5                 5               5                     5                                                                 Hip Flexion    Hip Extension    Knee Flexion    Knee Extension      R              5                           5                       5                           5                           L              5                           5                        5                           5                               Sensation - Light touch intact throughout    DTR's -             Biceps      Triceps     Brachioradialis      Patellar    Ankle    Toes/plantar response  R             1+             1+                  1+              1+            1+                 Down  L              1+             1+                 1+              1+           1+                 Down    Coordination - Finger to Nose intact b/l. No tremors appreciated    Gait and station - Normal casual gait. Romberg (-)    Labs:                        12.1   9.16  )-----------( 188      ( 25 Oct 2023 03:36 )             37.4     10-25    140  |  105  |  14  ----------------------------<  171<H>  3.6   |  23  |  0.56    Ca    9.5      25 Oct 2023 03:36    TPro  7.3  /  Alb  4.4  /  TBili  0.3  /  DBili  x   /  AST  19  /  ALT  15  /  AlkPhos  67  10-25    CAPILLARY BLOOD GLUCOSE      POCT Blood Glucose.: 175 mg/dL (25 Oct 2023 03:24)    LIVER FUNCTIONS - ( 25 Oct 2023 03:36 )  Alb: 4.4 g/dL / Pro: 7.3 g/dL / ALK PHOS: 67 U/L / ALT: 15 U/L / AST: 19 U/L / GGT: x             PT/INR - ( 25 Oct 2023 03:36 )   PT: 12.1 sec;   INR: 1.16 ratio         PTT - ( 25 Oct 2023 03:36 )  PTT:31.1 sec  CSF:                  Radiology:  < from: CT Angio Brain Stroke Protocol  w/ IV Cont (10.25.23 @ 03:47) >  CTA HEAD:  Patent intracranial circulation without flow limiting stenosis.  No evidence of aneurysm.    CTA NECK:  No occlusion, stenosis or dissection.      < end of copied text >  < from: CT Brain Stroke Protocol (10.25.23 @ 03:46) >  Unremarkable noncontrast head CT. No acute intracranial hemorrhage.    < end of copied text >     Neurology - Consult Note    -  Spectra: [phone] ([facility]), [phone] ([facility])  -    HPI: Patient JAMES ALONSO is a 58y (1965) woman with a past medical history of DL presenting for acute onset dizziness, generalized weakness, headache, bilateral upper extremity burning radiating from the neck to the hands, all happen suddenly at about 9 pm on Oct 24/23. Her presentation was as well concomitant with nausea and vomiting. She otherwise denied any abdominal pain or diarrhea. She denied any spinning sensation. She denied any focal loss of sensation or focal weakness aside form the generalized weakness. The burning sensations in the arms improved during ED course.    Review of Systems:    NEUROLOGICAL: +As stated in HPI above  All other review of systems is negative unless indicated above.    Allergies:  No Known Allergies  PMHx/PSHx/Family Hx: As above, otherwise see below     Social Hx:  No current use of tobacco, alcohol, or illicit drugs    Medications:  MEDICATIONS  (STANDING):  meclizine 12.5 milliGRAM(s) Oral Once    MEDICATIONS  (PRN):  Vitals:  T(C): 36.7 (10-25-23 @ 04:42), Max: 36.7 (10-25-23 @ 04:42)  HR: 66 (10-25-23 @ 04:42) (66 - 70)  BP: 124/76 (10-25-23 @ 04:42) (124/76 - 155/80)  RR: 18 (10-25-23 @ 04:42) (18 - 20)  SpO2: 95% (10-25-23 @ 04:42) (95% - 97%)    Physical Examination:     Neurologic Exam:  Mental status - Awake, Alert, Oriented to person, place, and time. Speech fluent, repetition and naming intact. Follows simple and complex commands. Attention/concentration intact    Cranial nerves - PERRLA, VFF, EOMI, face sensation (V1-V3) intact b/l, facial strength intact without asymmetry b/l, hearing intact b/l, palate with symmetric elevation, trapezius OR sternocleidomastoid 5/5 strength b/l, tongue midline on protrusion with full lateral movement    Motor - Normal bulk and tone throughout. No pronator drift.  Strength testing            Deltoid      Biceps      Triceps                 R            5                 5               5                     5                                                       L             5                 5               5                     5                                                                 Hip Flexion    Hip Extension    Knee Flexion    Knee Extension      R              5                           5                       5                           5                           L              5                           5                        5                           5                               Sensation - Light touch intact throughout    DTR's -             Biceps      Triceps     Brachioradialis      Patellar    Ankle    Toes/plantar response  R             1+             1+                  1+              1+            1+                 Down  L              1+             1+                 1+              1+           1+                 Down    Coordination - Finger to Nose intact b/l. No tremors appreciated    Gait and station - Normal casual gait. Romberg (-)    Labs:                        12.1   9.16  )-----------( 188      ( 25 Oct 2023 03:36 )             37.4     10-25    140  |  105  |  14  ----------------------------<  171<H>  3.6   |  23  |  0.56    Ca    9.5      25 Oct 2023 03:36    TPro  7.3  /  Alb  4.4  /  TBili  0.3  /  DBili  x   /  AST  19  /  ALT  15  /  AlkPhos  67  10-25    CAPILLARY BLOOD GLUCOSE      POCT Blood Glucose.: 175 mg/dL (25 Oct 2023 03:24)    LIVER FUNCTIONS - ( 25 Oct 2023 03:36 )  Alb: 4.4 g/dL / Pro: 7.3 g/dL / ALK PHOS: 67 U/L / ALT: 15 U/L / AST: 19 U/L / GGT: x             PT/INR - ( 25 Oct 2023 03:36 )   PT: 12.1 sec;   INR: 1.16 ratio         PTT - ( 25 Oct 2023 03:36 )  PTT:31.1 sec  CSF:                  Radiology:  < from: CT Angio Brain Stroke Protocol  w/ IV Cont (10.25.23 @ 03:47) >  CTA HEAD:  Patent intracranial circulation without flow limiting stenosis.  No evidence of aneurysm.    CTA NECK:  No occlusion, stenosis or dissection.      < end of copied text >  < from: CT Brain Stroke Protocol (10.25.23 @ 03:46) >  Unremarkable noncontrast head CT. No acute intracranial hemorrhage.    < end of copied text >

## 2023-10-25 NOTE — PATIENT PROFILE ADULT - FALL HARM RISK - HARM RISK INTERVENTIONS
Assistance with ambulation/Assistance OOB with selected safe patient handling equipment/Communicate Risk of Fall with Harm to all staff/Monitor for mental status changes/Monitor gait and stability/Reinforce activity limits and safety measures with patient and family/Reorient to person, place and time as needed/Review medications for side effects contributing to fall risk/Sit up slowly, dangle for a short time, stand at bedside before walking/Tailored Fall Risk Interventions/Use of alarms - bed, chair and/or voice tab/Visual Cue: Yellow wristband and red socks/Bed in lowest position, wheels locked, appropriate side rails in place/Call bell, personal items and telephone in reach/Instruct patient to call for assistance before getting out of bed or chair/Non-slip footwear when patient is out of bed/[place] to call system/Physically safe environment - no spills, clutter or unnecessary equipment/Purposeful Proactive Rounding/Room/bathroom lighting operational, light cord in reach

## 2023-10-25 NOTE — H&P ADULT - ASSESSMENT
58 [facility] female with carpal tunnel syndrome, HLD admitted for dizziness, now improved, also with abnormal EKG findings.

## 2023-10-25 NOTE — ED PROVIDER NOTE - ATTENDING CONTRIBUTION TO CARE
MD Combs:  patient seen and evaluated personally.   I agree with the History & Physical,  Impression & Plan other than what was detailed in my note.  MD Combs  58 year-old-female with history of HTN and HLD presents as a code stroke for sensation of vertigo. Started at 1 am today,  Pt is having associated chest tightness, two episodes of vomiting,  no unilat weakness, slurred speech, ha, bv, no arm pain, no sob, no n/v, no f/c afebrile vitals stable  non toxic well appearing, NC/AT,  conjunctiva non conjected, sclera anicteric, moist mucous membranes, neck supple, heart sounds, normal, no mrg, lungs cta b/l no wrr, abd soft non distended w/ no tenderness, no visual deformities of extremities, axox3,power 5/5x 4, cn 2-xii gi, no nystagmus noted, normal mood and affect, pt in for code stroke as per guidelines, will get cta head/neck, also acs workup w/ chest pain

## 2023-10-25 NOTE — H&P ADULT - PROBLEM SELECTOR PLAN 2
EKG with TWI in all leads, prolonged QTc. No prior to compare to. Negative troponins but came in with chest tightness. Pt is overall low risk of MACE but patient family with concerns, plan for [place] vacation soon, requests inpatient evaluation  - cardiology consult  - TTE to r/o structural

## 2023-10-25 NOTE — CONSULT NOTE ADULT - ATTENDING COMMENTS
seen in ED  Mo[place]   58y   woman with CTS, DL presenting for acute onset dizziness, generalized weakness, headache, bilateral upper extremity burning radiating from the neck to the hands, all happen suddenly at about 9 pm on Oct 24/23. Her presentation was as well concomitant with nausea and vomiting. She otherwise denied any abdominal pain or diarrhea. She denied any spinning sensation. She denied any focal loss of sensation or focal weakness aside form the generalized weakness.   CTH neg  CTA H/N neg, incidental thyroid nodule     impression: generalized altered status without focality likely due to metabolic disturbance  LUE hand numbness from known CTS    plan:  - metabolic workup and infectious workup   - MRI brain and C spine if symptoms return. can be outpatient   - improved with meclizline   - Hemoglobin A1c and lipid panel  - telemetry  - PT/OT  - check FS, glucose control <180  - GI/DVT ppx  - Counseling on diet, exercise, and medication adherence was done  - Counseling on smoking cessation and alcohol consumption offered when appropriate.  - Pain assessed and judicious use of narcotics when appropriate was discussed.    - Stroke education given when appropriate.  - Importance of fall prevention discussed.   - Differential diagnosis and plan of care discussed with patient and/or family and primary team  - Thank you for allowing me to participate in the care of this patient. Call with questions.   spoke with tiny in  in ED  Denzel Sainz MD  Vascular Neurology  Office: [phone]

## 2023-10-25 NOTE — H&P ADULT - HISTORY OF PRESENT ILLNESS
58F with HLD, no prior hx of stroke or MI here for 1 day history of dizziness, with nausea and 3 episodes of emesis.  58 [facility] female with HLD, no prior hx of stroke or MI here for 1 day history of dizziness, with nausea and 3 episodes of emesis associated with chest tightness and weakness. She describes chronic episodic bilateral hand pain/cramping that preceded the nausea which she thinks is related to her history of arthritis.  58 [place] female with carpal tunnel syndrome, HLD, no prior hx of stroke or MI here for 1 day history of dizziness, with nausea and 3 episodes of emesis associated with chest tightness and weakness. She describes chronic episodic bilateral hand pain/cramping that preceded the nausea. 58 [place] female with carpal tunnel syndrome, HLD, no prior hx of stroke or MI here for 1 day history of dizziness, with nausea and 3 episodes of emesis associated with chest tightness and weakness. She describes chronic episodic bilateral hand pain/cramping that preceded the nausea. In the ED, pt was called for code stroke, with negative CT angio and CTH findings. Her symptoms largely resolved after meclizine but still with some bilateral hand weakness/numbness.

## 2023-10-26 LAB
ANION GAP SERPL CALC-SCNC: 11 MMOL/L — SIGNIFICANT CHANGE UP (ref 5–17)
ANION GAP SERPL CALC-SCNC: 11 MMOL/L — SIGNIFICANT CHANGE UP (ref 5–17)
BUN SERPL-MCNC: 12 MG/DL — SIGNIFICANT CHANGE UP (ref 7–23)
BUN SERPL-MCNC: 12 MG/DL — SIGNIFICANT CHANGE UP (ref 7–23)
CALCIUM SERPL-MCNC: 9.4 MG/DL — SIGNIFICANT CHANGE UP (ref 8.4–10.5)
CALCIUM SERPL-MCNC: 9.4 MG/DL — SIGNIFICANT CHANGE UP (ref 8.4–10.5)
CHLORIDE SERPL-SCNC: 104 MMOL/L — SIGNIFICANT CHANGE UP (ref 96–108)
CHLORIDE SERPL-SCNC: 104 MMOL/L — SIGNIFICANT CHANGE UP (ref 96–108)
CO2 SERPL-SCNC: 24 MMOL/L — SIGNIFICANT CHANGE UP (ref 22–31)
CO2 SERPL-SCNC: 24 MMOL/L — SIGNIFICANT CHANGE UP (ref 22–31)
CREAT SERPL-MCNC: 0.65 MG/DL — SIGNIFICANT CHANGE UP (ref 0.5–1.3)
CREAT SERPL-MCNC: 0.65 MG/DL — SIGNIFICANT CHANGE UP (ref 0.5–1.3)
EGFR: 101 ML/MIN/1.73M2 — SIGNIFICANT CHANGE UP
EGFR: 101 ML/MIN/1.73M2 — SIGNIFICANT CHANGE UP
GLUCOSE SERPL-MCNC: 97 MG/DL — SIGNIFICANT CHANGE UP (ref 70–99)
GLUCOSE SERPL-MCNC: 97 MG/DL — SIGNIFICANT CHANGE UP (ref 70–99)
HCT VFR BLD CALC: 39.1 % — SIGNIFICANT CHANGE UP (ref 34.5–45)
HCT VFR BLD CALC: 39.1 % — SIGNIFICANT CHANGE UP (ref 34.5–45)
HCV AB S/CO SERPL IA: 0.1 S/CO — SIGNIFICANT CHANGE UP (ref 0–0.99)
HCV AB S/CO SERPL IA: 0.1 S/CO — SIGNIFICANT CHANGE UP (ref 0–0.99)
HCV AB SERPL-IMP: SIGNIFICANT CHANGE UP
HCV AB SERPL-IMP: SIGNIFICANT CHANGE UP
HGB BLD-MCNC: 12.8 G/DL — SIGNIFICANT CHANGE UP (ref 11.5–15.5)
HGB BLD-MCNC: 12.8 G/DL — SIGNIFICANT CHANGE UP (ref 11.5–15.5)
MAGNESIUM SERPL-MCNC: 2 MG/DL — SIGNIFICANT CHANGE UP (ref 1.6–2.6)
MAGNESIUM SERPL-MCNC: 2 MG/DL — SIGNIFICANT CHANGE UP (ref 1.6–2.6)
MCHC RBC-ENTMCNC: 30.5 PG — SIGNIFICANT CHANGE UP (ref 27–34)
MCHC RBC-ENTMCNC: 30.5 PG — SIGNIFICANT CHANGE UP (ref 27–34)
MCHC RBC-ENTMCNC: 32.7 GM/DL — SIGNIFICANT CHANGE UP (ref 32–36)
MCHC RBC-ENTMCNC: 32.7 GM/DL — SIGNIFICANT CHANGE UP (ref 32–36)
MCV RBC AUTO: 93.1 FL — SIGNIFICANT CHANGE UP (ref 80–100)
MCV RBC AUTO: 93.1 FL — SIGNIFICANT CHANGE UP (ref 80–100)
NRBC # BLD: 0 /100 WBCS — SIGNIFICANT CHANGE UP (ref 0–0)
NRBC # BLD: 0 /100 WBCS — SIGNIFICANT CHANGE UP (ref 0–0)
PHOSPHATE SERPL-MCNC: 4.6 MG/DL — HIGH (ref 2.5–4.5)
PHOSPHATE SERPL-MCNC: 4.6 MG/DL — HIGH (ref 2.5–4.5)
PLATELET # BLD AUTO: 208 K/UL — SIGNIFICANT CHANGE UP (ref 150–400)
PLATELET # BLD AUTO: 208 K/UL — SIGNIFICANT CHANGE UP (ref 150–400)
POTASSIUM SERPL-MCNC: 4 MMOL/L — SIGNIFICANT CHANGE UP (ref 3.5–5.3)
POTASSIUM SERPL-MCNC: 4 MMOL/L — SIGNIFICANT CHANGE UP (ref 3.5–5.3)
POTASSIUM SERPL-SCNC: 4 MMOL/L — SIGNIFICANT CHANGE UP (ref 3.5–5.3)
POTASSIUM SERPL-SCNC: 4 MMOL/L — SIGNIFICANT CHANGE UP (ref 3.5–5.3)
RBC # BLD: 4.2 M/UL — SIGNIFICANT CHANGE UP (ref 3.8–5.2)
RBC # BLD: 4.2 M/UL — SIGNIFICANT CHANGE UP (ref 3.8–5.2)
RBC # FLD: 14 % — SIGNIFICANT CHANGE UP (ref 10.3–14.5)
RBC # FLD: 14 % — SIGNIFICANT CHANGE UP (ref 10.3–14.5)
SODIUM SERPL-SCNC: 139 MMOL/L — SIGNIFICANT CHANGE UP (ref 135–145)
SODIUM SERPL-SCNC: 139 MMOL/L — SIGNIFICANT CHANGE UP (ref 135–145)
TSH SERPL-MCNC: 1.87 UIU/ML — SIGNIFICANT CHANGE UP (ref 0.27–4.2)
TSH SERPL-MCNC: 1.87 UIU/ML — SIGNIFICANT CHANGE UP (ref 0.27–4.2)
WBC # BLD: 8.13 K/UL — SIGNIFICANT CHANGE UP (ref 3.8–10.5)
WBC # BLD: 8.13 K/UL — SIGNIFICANT CHANGE UP (ref 3.8–10.5)
WBC # FLD AUTO: 8.13 K/UL — SIGNIFICANT CHANGE UP (ref 3.8–10.5)
WBC # FLD AUTO: 8.13 K/UL — SIGNIFICANT CHANGE UP (ref 3.8–10.5)

## 2023-10-26 PROCEDURE — 75574 CT ANGIO HRT W/3D IMAGE: CPT | Mod: 26

## 2023-10-26 PROCEDURE — 99232 SBSQ HOSP IP/OBS MODERATE 35: CPT

## 2023-10-26 RX ORDER — CHLORHEXIDINE GLUCONATE 213 G/1000ML
1 SOLUTION TOPICAL
Refills: 0 | Status: DISCONTINUED | OUTPATIENT
Start: 2023-10-26 | End: 2023-10-27

## 2023-10-26 RX ORDER — METOPROLOL TARTRATE 50 MG
25 TABLET ORAL ONCE
Refills: 0 | Status: COMPLETED | OUTPATIENT
Start: 2023-10-26 | End: 2023-10-26

## 2023-10-26 RX ORDER — CARBAMIDE PEROXIDE 81.86 MG/ML
5 SOLUTION/ DROPS AURICULAR (OTIC)
Refills: 0 | Status: DISCONTINUED | OUTPATIENT
Start: 2023-10-26 | End: 2023-10-27

## 2023-10-26 RX ADMIN — Medication 25 MILLIGRAM(S): at 13:36

## 2023-10-26 RX ADMIN — CARBAMIDE PEROXIDE 5 DROP(S): 81.86 SOLUTION/ DROPS AURICULAR (OTIC) at 19:43

## 2023-10-26 RX ADMIN — ATORVASTATIN CALCIUM 20 MILLIGRAM(S): 80 TABLET, FILM COATED ORAL at 21:18

## 2023-10-26 RX ADMIN — Medication 81 MILLIGRAM(S): at 13:36

## 2023-10-26 NOTE — PROGRESS NOTE ADULT - SUBJECTIVE AND OBJECTIVE BOX
Neurology Progress Note    S: Patient seen and examined. No new events overnight. patient denied CP, SOB, HA or pain.     Medication:  acetaminophen     Tablet .. 650 milliGRAM(s) Oral every 6 hours PRN  aspirin enteric coated 81 milliGRAM(s) Oral daily  atorvastatin 20 milliGRAM(s) Oral at bedtime  carbamide peroxide Otic Solution 5 Drop(s) Both Ears two times a day  chlorhexidine 2% Cloths 1 Application(s) Topical <User Schedule>  meclizine 12.5 milliGRAM(s) Oral every 8 hours PRN  melatonin 3 milliGRAM(s) Oral at bedtime PRN  metoprolol tartrate 25 milliGRAM(s) Oral once      Vitals:  Vital Signs Last 24 Hrs  T(C): 37.1 (26 Oct 2023 12:25), Max: 37.1 (26 Oct 2023 12:25)  T(F): 98.7 (26 Oct 2023 12:25), Max: 98.7 (26 Oct 2023 12:25)  HR: 80 (26 Oct 2023 12:25) (64 - 80)  BP: 103/68 (26 Oct 2023 12:25) (103/68 - 141/88)  BP(mean): --  RR: 18 (26 Oct 2023 12:25) (18 - 18)  SpO2: 94% (26 Oct 2023 12:25) (94% - 96%)    Parameters below as of 26 Oct 2023 12:25  Patient On (Oxygen Delivery Method): room air        General Exam:   General Appearance: Appropriately dressed and in no acute distress       Head: Normocephalic, atraumatic and no dysmorphic features  Ear, Nose, and Throat: Moist mucous membranes  CVS: S1S2+  Resp: No SOB, no wheeze or rhonchi  Abd: soft NTND  Extremities: No edema, no cyanosis  Skin: No bruises, no rashes     Neurological Exam:  Mental Status: Awake, alert and oriented x 3.  Able to follow simple and complex verbal commands. Able to name and repeat. fluent speech. No obvious aphasia or dysarthria noted.   Cranial Nerves: PERRL, EOMI, VFFC, sensation V1-V3 intact,  no obvious facial asymmetry , equal elevation of palate, scm/trap 5/5, tongue is midline on protrusion. no obvious papilledema on fundoscopic exam. Hearing is grossly intact.   Motor: Normal bulk, tone and strength throughout. Fine finger movements were intact and symmetric. no tremors or drift noted.    Sensation: Intact to light touch and pinprick throughout. no right/left confusion. no extinction to tactile on DSS. Romberg was negative.   Reflexes: 1+ throughout at biceps, brachioradialis, triceps, patellars and ankles bilaterally and equal. No clonus. R toe and L toe were both downgoing.  Coordination: No dysmetria on FNF or HKS  Gait: Narrow based and steady. Able to tandem. no limitations in gait.     I personally reviewed the below data/images/labs:      CBC Full  -  ( 26 Oct 2023 06:36 )  WBC Count : 8.13 K/uL  RBC Count : 4.20 M/uL  Hemoglobin : 12.8 g/dL  Hematocrit : 39.1 %  Platelet Count - Automated : 208 K/uL  Mean Cell Volume : 93.1 fl  Mean Cell Hemoglobin : 30.5 pg  Mean Cell Hemoglobin Concentration : 32.7 gm/dL  Auto Neutrophil # : x  Auto Lymphocyte # : x  Auto Monocyte # : x  Auto Eosinophil # : x  Auto Basophil # : x  Auto Neutrophil % : x  Auto Lymphocyte % : x  Auto Monocyte % : x  Auto Eosinophil % : x  Auto Basophil % : x    10-26    139  |  104  |  12  ----------------------------<  97  4.0   |  24  |  0.65    Ca    9.4      26 Oct 2023 06:36  Phos  4.6     10-26  Mg     2.0     10-26    TPro  7.3  /  Alb  4.4  /  TBili  0.3  /  DBili  x   /  AST  19  /  ALT  15  /  AlkPhos  67  10-25    LIVER FUNCTIONS - ( 25 Oct 2023 03:36 )  Alb: 4.4 g/dL / Pro: 7.3 g/dL / ALK PHOS: 67 U/L / ALT: 15 U/L / AST: 19 U/L / GGT: x           PT/INR - ( 25 Oct 2023 03:36 )   PT: 12.1 sec;   INR: 1.16 ratio         PTT - ( 25 Oct 2023 03:36 )  PTT:31.1 sec  Urinalysis Basic - ( 26 Oct 2023 06:36 )    Color: x / Appearance: x / SG: x / pH: x  Gluc: 97 mg/dL / Ketone: x  / Bili: x / Urobili: x   Blood: x / Protein: x / Nitrite: x   Leuk Esterase: x / RBC: x / WBC x   Sq Epi: x / Non Sq Epi: x / Bacteria: x       Neurology Progress Note    S: Patient seen and examined. No new events overnight. patient denied CP, SOB, HA or pain. family at bedside     Medication:  acetaminophen     Tablet .. 650 milliGRAM(s) Oral every 6 hours PRN  aspirin enteric coated 81 milliGRAM(s) Oral daily  atorvastatin 20 milliGRAM(s) Oral at bedtime  carbamide peroxide Otic Solution 5 Drop(s) Both Ears two times a day  chlorhexidine 2% Cloths 1 Application(s) Topical <User Schedule>  meclizine 12.5 milliGRAM(s) Oral every 8 hours PRN  melatonin 3 milliGRAM(s) Oral at bedtime PRN  metoprolol tartrate 25 milliGRAM(s) Oral once      Vitals:  Vital Signs Last 24 Hrs  T(C): 37.1 (26 Oct 2023 12:25), Max: 37.1 (26 Oct 2023 12:25)  T(F): 98.7 (26 Oct 2023 12:25), Max: 98.7 (26 Oct 2023 12:25)  HR: 80 (26 Oct 2023 12:25) (64 - 80)  BP: 103/68 (26 Oct 2023 12:25) (103/68 - 141/88)  BP(mean): --  RR: 18 (26 Oct 2023 12:25) (18 - 18)  SpO2: 94% (26 Oct 2023 12:25) (94% - 96%)    Parameters below as of 26 Oct 2023 12:25  Patient On (Oxygen Delivery Method): room air        General Exam:   General Appearance: Appropriately dressed and in no acute distress       Head: Normocephalic, atraumatic and no dysmorphic features  Ear, Nose, and Throat: Moist mucous membranes  CVS: S1S2+  Resp: No SOB, no wheeze or rhonchi  Abd: soft NTND  Extremities: No edema, no cyanosis  Skin: No bruises, no rashes     Neurological Exam:  Mental Status: Awake, alert and oriented x 3.  Able to follow simple and complex verbal commands. Able to name and repeat. fluent speech. No obvious aphasia or dysarthria noted.   Cranial Nerves: PERRL, EOMI, VFFC, sensation V1-V3 intact,  no obvious facial asymmetry , equal elevation of palate, scm/trap 5/5, tongue is midline on protrusion. no obvious papilledema on fundoscopic exam. Hearing is grossly intact.   Motor: Normal bulk, tone and strength throughout. Fine finger movements were intact and symmetric. no tremors or drift noted.    Sensation: Intact to light touch and pinprick throughout. no right/left confusion. no extinction to tactile on DSS.   Reflexes: 1+ throughout at biceps, brachioradialis, triceps, patellars and ankles bilaterally and equal. No clonus. R toe and L toe were both downgoing.  Coordination: No dysmetria on FNF or HKS  Gait: deferred   I personally reviewed the below data/images/labs:      CBC Full  -  ( 26 Oct 2023 06:36 )  WBC Count : 8.13 K/uL  RBC Count : 4.20 M/uL  Hemoglobin : 12.8 g/dL  Hematocrit : 39.1 %  Platelet Count - Automated : 208 K/uL  Mean Cell Volume : 93.1 fl  Mean Cell Hemoglobin : 30.5 pg  Mean Cell Hemoglobin Concentration : 32.7 gm/dL  Auto Neutrophil # : x  Auto Lymphocyte # : x  Auto Monocyte # : x  Auto Eosinophil # : x  Auto Basophil # : x  Auto Neutrophil % : x  Auto Lymphocyte % : x  Auto Monocyte % : x  Auto Eosinophil % : x  Auto Basophil % : x    10-26    139  |  104  |  12  ----------------------------<  97  4.0   |  24  |  0.65    Ca    9.4      26 Oct 2023 06:36  Phos  4.6     10-26  Mg     2.0     10-26    TPro  7.3  /  Alb  4.4  /  TBili  0.3  /  DBili  x   /  AST  19  /  ALT  15  /  AlkPhos  67  10-25    LIVER FUNCTIONS - ( 25 Oct 2023 03:36 )  Alb: 4.4 g/dL / Pro: 7.3 g/dL / ALK PHOS: 67 U/L / ALT: 15 U/L / AST: 19 U/L / GGT: x           PT/INR - ( 25 Oct 2023 03:36 )   PT: 12.1 sec;   INR: 1.16 ratio         PTT - ( 25 Oct 2023 03:36 )  PTT:31.1 sec  Urinalysis Basic - ( 26 Oct 2023 06:36 )    Color: x / Appearance: x / SG: x / pH: x  Gluc: 97 mg/dL / Ketone: x  / Bili: x / Urobili: x   Blood: x / Protein: x / Nitrite: x   Leuk Esterase: x / RBC: x / WBC x   Sq Epi: x / Non Sq Epi: x / Bacteria: x        < from: CT Angio Brain Stroke Protocol  w/ IV Cont (10.25.23 @ 03:47) >  CTA HEAD:  Patent intracranial circulation without flow limiting stenosis.  No evidence of aneurysm.    CTA NECK:  No occlusion, stenosis or dissection.      < end of copied text >  < from: CT Brain Stroke Protocol (10.25.23 @ 03:46) >  Unremarkable noncontrast head CT. No acute intracranial hemorrhage.    < end of copied text >

## 2023-10-26 NOTE — PROGRESS NOTE ADULT - PROBLEM SELECTOR PLAN 1
resolved  ddx: Meniere's disease, benign paroxysmal positional vertigo, vestibular neuritis, less likely labyrinthitis  - c/w meclizine PRN  - MRI brain/c spine if symptoms return  - A1C- 5.5

## 2023-10-26 NOTE — PROGRESS NOTE ADULT - PROBLEM SELECTOR PLAN 3
Incidental 1.2cm R thyroid nodule seen on CT  - TSH - 1.87  - Thyroid Sono- Right thyroid nodule measuring 1.2 cm with TIRADscore 4. Pt recommended to follow-up thyroid ultrasound in one year to assess for stability

## 2023-10-26 NOTE — PROGRESS NOTE ADULT - SUBJECTIVE AND OBJECTIVE BOX
Andre Reyes, M.D.  Pager: [phone]  Office: [phone]    Patient is a 58y old  Female who presents with a chief complaint of dizziness (26 Oct 2023 11:30)          SUBJECTIVE / OVERNIGHT EVENTS:    No acute overnight events.    ROS: ( - ) Fever, ( - )Chills,  ( - )Nausea/Vomiting, ( - ) Cough, ( - )Shortness of breath, ( - )Chest Pain    MEDICATIONS  (STANDING):  aspirin enteric coated 81 milliGRAM(s) Oral daily  atorvastatin 20 milliGRAM(s) Oral at bedtime  carbamide peroxide Otic Solution 5 Drop(s) Both Ears two times a day  chlorhexidine 2% Cloths 1 Application(s) Topical <User Schedule>    MEDICATIONS  (PRN):  acetaminophen     Tablet .. 650 milliGRAM(s) Oral every 6 hours PRN Temp greater or equal to 38C (100.4F), Mild Pain (1 - 3)  meclizine 12.5 milliGRAM(s) Oral every 8 hours PRN Dizziness  melatonin 3 milliGRAM(s) Oral at bedtime PRN Insomnia          T(C): 37.1 (10-26 @ 12:25), Max: 37.1 (10-26 @ 12:25)   HR: 80   BP: 103/69   RR: 18   SpO2: 94%    PHYSICAL EXAM:    CONSTITUTIONAL: NAD, well-developed, well-groomed  EYES: PERRLA; conjunctiva and sclera clear  ENMT: Moist oral mucosa, no pharyngeal injection or exudates; normal dentition  NECK: Supple, no palpable masses; no thyromegaly  RESPIRATORY: Normal respiratory effort; lungs are clear to auscultation bilaterally  CARDIOVASCULAR: Regular rate and rhythm, normal S1 and S2, no murmur/rub/gallop; No lower extremity edema; Peripheral pulses are 2+ bilaterally  ABDOMEN: Nontender to palpation, normoactive bowel sounds, no rebound/guarding; No hepatosplenomegaly  MUSCULOSKELETAL:  no clubbing or cyanosis of digits; no joint swelling or tenderness to palpation  PSYCH: A+O to person, place, and time; affect appropriate  NEUROLOGY: CN 2-12 are intact and symmetric; no gross sensory deficits   SKIN: No rashes; no palpable lesions      LABS:                        12.8   8.13  )-----------( 208      ( 26 Oct 2023 06:36 )             39.1      10-26    139  |  104  |  12  ----------------------------<  97  4.0   |  24  |  0.65    Ca    9.4      26 Oct 2023 06:36  Phos  4.6     10-26  Mg     2.0     10-26    TPro  7.3  /  Alb  4.4  /  TBili  0.3  /  DBili  x   /  AST  19  /  ALT  15  /  AlkPhos  67  10-25       CAPILLARY BLOOD GLUCOSE          RADIOLOGY & ADDITIONAL TESTS:    Imaging Personally Reviewed:  Consultant(s) Notes Reviewed:    Care Discussed with Consultants/Other Providers:   Andre Reyes, M.D.  Pager: [phone]  Office: [phone]    Patient is a 58y old  Female who presents with a chief complaint of dizziness (26 Oct 2023 11:30)      pt declined  service. she requested that her daughter translate.    SUBJECTIVE / OVERNIGHT EVENTS:    No acute overnight events.  dizziness has resolved but having a blowing sensation in left ear    ROS: ( - ) Fever, ( - )Chills,  ( - )Nausea/Vomiting, ( - ) Cough, ( - )Shortness of breath, ( - )Chest Pain    MEDICATIONS  (STANDING):  aspirin enteric coated 81 milliGRAM(s) Oral daily  atorvastatin 20 milliGRAM(s) Oral at bedtime  carbamide peroxide Otic Solution 5 Drop(s) Both Ears two times a day  chlorhexidine 2% Cloths 1 Application(s) Topical <User Schedule>    MEDICATIONS  (PRN):  acetaminophen     Tablet .. 650 milliGRAM(s) Oral every 6 hours PRN Temp greater or equal to 38C (100.4F), Mild Pain (1 - 3)  meclizine 12.5 milliGRAM(s) Oral every 8 hours PRN Dizziness  melatonin 3 milliGRAM(s) Oral at bedtime PRN Insomnia          T(C): 37.1 (10-26 @ 12:25), Max: 37.1 (10-26 @ 12:25)   HR: 80   BP: 103/69   RR: 18   SpO2: 94%    PHYSICAL EXAM:    CONSTITUTIONAL: NAD, well-developed, well-groomed  EYES: PERRLA; conjunctiva and sclera clear  ENMT: Moist oral mucosa, no pharyngeal injection or exudates; normal dentition  NECK: Supple, no palpable masses; no thyromegaly  RESPIRATORY: Normal respiratory effort; lungs are clear to auscultation bilaterally  CARDIOVASCULAR: Regular rate and rhythm, normal S1 and S2, no murmur/rub/gallop; No lower extremity edema; Peripheral pulses are 2+ bilaterally  ABDOMEN: Nontender to palpation, normoactive bowel sounds, no rebound/guarding; No hepatosplenomegaly  MUSCULOSKELETAL:  no clubbing or cyanosis of digits; no joint swelling or tenderness to palpation  PSYCH: A+O to person, place, and time; affect appropriate  NEUROLOGY: CN 2-12 are intact and symmetric; no gross sensory deficits   SKIN: No rashes; no palpable lesions      LABS:                        12.8   8.13  )-----------( 208      ( 26 Oct 2023 06:36 )             39.1      10-26    139  |  104  |  12  ----------------------------<  97  4.0   |  24  |  0.65    Ca    9.4      26 Oct 2023 06:36  Phos  4.6     10-26  Mg     2.0     10-26    TPro  7.3  /  Alb  4.4  /  TBili  0.3  /  DBili  x   /  AST  19  /  ALT  15  /  AlkPhos  67  10-25       CAPILLARY BLOOD GLUCOSE          RADIOLOGY & ADDITIONAL TESTS:    Imaging Personally Reviewed:  Consultant(s) Notes Reviewed:    Care Discussed with Consultants/Other Providers:

## 2023-10-26 NOTE — PROGRESS NOTE ADULT - PROBLEM SELECTOR PLAN 2
EKG with TWI in all leads, prolonged QTc. No prior to compare to. Negative troponins but came in with chest tightness. Pt is overall low risk of MACE but patient family with concerns, plan for [place] vacation soon, requests inpatient evaluation  - cardiology eval appreciated - pt to get a CT coronaries for better risk stratification  - TTE - Normal LV fxn, no valvular abnormalities

## 2023-10-26 NOTE — PROGRESS NOTE ADULT - SUBJECTIVE AND OBJECTIVE BOX
Andre Reyes, M.D.  Pager: [phone]  Office: [phone]    Patient is a 58y old  Female who presents with a chief complaint of dizziness (26 Oct 2023 13:51)          SUBJECTIVE / OVERNIGHT EVENTS:    No acute overnight events.    ROS: ( - ) Fever, ( - )Chills,  ( - )Nausea/Vomiting, ( - ) Cough, ( - )Shortness of breath, ( - )Chest Pain    MEDICATIONS  (STANDING):  aspirin enteric coated 81 milliGRAM(s) Oral daily  atorvastatin 20 milliGRAM(s) Oral at bedtime  carbamide peroxide Otic Solution 5 Drop(s) Both Ears two times a day  chlorhexidine 2% Cloths 1 Application(s) Topical <User Schedule>    MEDICATIONS  (PRN):  acetaminophen     Tablet .. 650 milliGRAM(s) Oral every 6 hours PRN Temp greater or equal to 38C (100.4F), Mild Pain (1 - 3)  meclizine 12.5 milliGRAM(s) Oral every 8 hours PRN Dizziness  melatonin 3 milliGRAM(s) Oral at bedtime PRN Insomnia          T(C): 37.1 (10-26 @ 12:25), Max: 37.1 (10-26 @ 12:25)   HR: 80   BP: 103/69   RR: 18   SpO2: 94%    PHYSICAL EXAM:    CONSTITUTIONAL: NAD, well-developed, well-groomed  EYES: PERRLA; conjunctiva and sclera clear  ENMT: Moist oral mucosa, no pharyngeal injection or exudates; normal dentition  NECK: Supple, no palpable masses; no thyromegaly  RESPIRATORY: Normal respiratory effort; lungs are clear to auscultation bilaterally  CARDIOVASCULAR: Regular rate and rhythm, normal S1 and S2, no murmur/rub/gallop; No lower extremity edema; Peripheral pulses are 2+ bilaterally  ABDOMEN: Nontender to palpation, normoactive bowel sounds, no rebound/guarding; No hepatosplenomegaly  MUSCULOSKELETAL:  no clubbing or cyanosis of digits; no joint swelling or tenderness to palpation  PSYCH: A+O to person, place, and time; affect appropriate  NEUROLOGY: CN 2-12 are intact and symmetric; no gross sensory deficits   SKIN: No rashes; no palpable lesions      LABS:                        12.8   8.13  )-----------( 208      ( 26 Oct 2023 06:36 )             39.1      10-26    139  |  104  |  12  ----------------------------<  97  4.0   |  24  |  0.65    Ca    9.4      26 Oct 2023 06:36  Phos  4.6     10-26  Mg     2.0     10-26    TPro  7.3  /  Alb  4.4  /  TBili  0.3  /  DBili  x   /  AST  19  /  ALT  15  /  AlkPhos  67  10-25       CAPILLARY BLOOD GLUCOSE          RADIOLOGY & ADDITIONAL TESTS:    Imaging Personally Reviewed:  Consultant(s) Notes Reviewed:    Care Discussed with Consultants/Other Providers:

## 2023-10-26 NOTE — PROGRESS NOTE ADULT - SUBJECTIVE AND OBJECTIVE BOX
DATE OF SERVICE: 10-26-23 @ 11:30    Patient is a 58y old  Female who presents with a chief complaint of dizziness (25 Oct 2023 18:37)      INTERVAL HISTORY: Feels ok.     REVIEW OF SYSTEMS:  CONSTITUTIONAL: No weakness  EYES/ENT: No visual changes;  No throat pain   NECK: No pain or stiffness  RESPIRATORY: No cough, wheezing; No shortness of breath  CARDIOVASCULAR: No chest pain or palpitations  GASTROINTESTINAL: No abdominal  pain. No nausea, vomiting, or hematemesis  GENITOURINARY: No dysuria, frequency or hematuria  NEUROLOGICAL: No stroke like symptoms  SKIN: No rashes    TELEMETRY Personally reviewed: SR 60-80  	  MEDICATIONS:  metoprolol tartrate 25 milliGRAM(s) Oral once        PHYSICAL EXAM:  T(C): 36.9 (10-26-23 @ 04:11), Max: 36.9 (10-25-23 @ 20:21)  HR: 65 (10-26-23 @ 04:11) (64 - 65)  BP: 129/79 (10-26-23 @ 04:11) (123/81 - 141/88)  RR: 18 (10-26-23 @ 04:11) (18 - 18)  SpO2: 95% (10-26-23 @ 04:11) (94% - 96%)  Wt(kg): --  I&O's Summary    26 Oct 2023 07:01  -  26 Oct 2023 11:30  --------------------------------------------------------  IN: 200 mL / OUT: 0 mL / NET: 200 mL          Appearance: In no distress	  HEENT:    PERRL, EOMI	  Cardiovascular:  S1 S2, No JVD  Respiratory: Lungs clear to auscultation	  Gastrointestinal:  Soft, Non-tender, + BS	  Vascularature:  No edema of LE  Psychiatric: Appropriate affect   Neuro: no acute focal deficits                               12.8   8.13  )-----------( 208      ( 26 Oct 2023 06:36 )             39.1     10-26    139  |  104  |  12  ----------------------------<  97  4.0   |  24  |  0.65    Ca    9.4      26 Oct 2023 06:36  Phos  4.6     10-26  Mg     2.0     10-26    TPro  7.3  /  Alb  4.4  /  TBili  0.3  /  DBili  x   /  AST  19  /  ALT  15  /  AlkPhos  67  10-25        Labs personally reviewed      ASSESSMENT/PLAN: 	      58 [place] female with carpal tunnel syndrome, HLD, no prior hx of stroke or MI here for 1 day history of dizziness, with nausea and 3 episodes of emesis associated with chest tightness and weakness. She describes chronic episodic bilateral hand pain/cramping that preceded the nausea. In the ED, pt was called for code stroke, with negative CT angio and CTH findings. Her symptoms largely resolved after meclizine but still with some bilateral hand weakness/numbness.      1 Chest pain with Abnormal ECG  - EKG with ischemic changes  - Troponin wnl x2   - echo wnl  - recommend CTA Heart to further define coronary anatomy     2- Dizziness   - Neuro consult appreciated  - check orthostatic   - consider check carotid U/S   -CT head as above normal     3- HLD   - LDL 99 wnl   - c/w Atorvastatin 20mg        Yin Campos, NENITA-NP   Domenic Barraza DO [facility]  Cardiovascular Medicine  [address], [address]  Available through call or text on [employer]  Office: [phone]   DATE OF SERVICE: 10-26-23 @ 11:30    Patient is a 58y old  Female who presents with a chief complaint of dizziness (25 Oct 2023 18:37)      INTERVAL HISTORY: Feels ok.     REVIEW OF SYSTEMS:  CONSTITUTIONAL: No weakness  EYES/ENT: No visual changes;  No throat pain   NECK: No pain or stiffness  RESPIRATORY: No cough, wheezing; No shortness of breath  CARDIOVASCULAR: No chest pain or palpitations  GASTROINTESTINAL: No abdominal  pain. No nausea, vomiting, or hematemesis  GENITOURINARY: No dysuria, frequency or hematuria  NEUROLOGICAL: No stroke like symptoms  SKIN: No rashes    TELEMETRY Personally reviewed: SR 60-80  	  MEDICATIONS:  metoprolol tartrate 25 milliGRAM(s) Oral once        PHYSICAL EXAM:  T(C): 36.9 (10-26-23 @ 04:11), Max: 36.9 (10-25-23 @ 20:21)  HR: 65 (10-26-23 @ 04:11) (64 - 65)  BP: 129/79 (10-26-23 @ 04:11) (123/81 - 141/88)  RR: 18 (10-26-23 @ 04:11) (18 - 18)  SpO2: 95% (10-26-23 @ 04:11) (94% - 96%)  Wt(kg): --  I&O's Summary    26 Oct 2023 07:01  -  26 Oct 2023 11:30  --------------------------------------------------------  IN: 200 mL / OUT: 0 mL / NET: 200 mL          Appearance: In no distress	  HEENT:    PERRL, EOMI	  Cardiovascular:  S1 S2, No JVD  Respiratory: Lungs clear to auscultation	  Gastrointestinal:  Soft, Non-tender, + BS	  Vascularature:  No edema of LE  Psychiatric: Appropriate affect   Neuro: no acute focal deficits                               12.8   8.13  )-----------( 208      ( 26 Oct 2023 06:36 )             39.1     10-26    139  |  104  |  12  ----------------------------<  97  4.0   |  24  |  0.65    Ca    9.4      26 Oct 2023 06:36  Phos  4.6     10-26  Mg     2.0     10-26    TPro  7.3  /  Alb  4.4  /  TBili  0.3  /  DBili  x   /  AST  19  /  ALT  15  /  AlkPhos  67  10-25        Labs personally reviewed      ASSESSMENT/PLAN: 	  58 [place] female with carpal tunnel syndrome, HLD, no prior hx of stroke or MI here for 1 day history of dizziness, with nausea and 3 episodes of emesis associated with chest tightness and weakness. She describes chronic episodic bilateral hand pain/cramping that preceded the nausea. In the ED, pt was called for code stroke, with negative CT angio and CTH findings. Her symptoms largely resolved after meclizine but still with some bilateral hand weakness/numbness.      1 Chest pain with Abnormal ECG  - EKG with ischemic changes  - Troponin wnl x2   - echo wnl  - recommend CTA Heart to further define coronary anatomy     2- Dizziness   - Neuro consult appreciated  - check orthostatic   - consider check carotid U/S   -CT head as above normal     3- HLD   - LDL 99 wnl   - c/w Atorvastatin 20mg        Yin Campos, NENITA-NP   Domenic Barraza DO [facility]  Cardiovascular Medicine  [address], [address]  Available through call or text on [employer]  Office: [phone]

## 2023-10-27 ENCOUNTER — TRANSCRIPTION ENCOUNTER (OUTPATIENT)
Age: 58
End: 2023-10-27

## 2023-10-27 VITALS
RESPIRATION RATE: 18 BRPM | OXYGEN SATURATION: 94 % | TEMPERATURE: 98 F | SYSTOLIC BLOOD PRESSURE: 129 MMHG | DIASTOLIC BLOOD PRESSURE: 83 MMHG | HEART RATE: 64 BPM

## 2023-10-27 LAB
MRSA PCR RESULT.: SIGNIFICANT CHANGE UP
MRSA PCR RESULT.: SIGNIFICANT CHANGE UP
S AUREUS DNA NOSE QL NAA+PROBE: SIGNIFICANT CHANGE UP
S AUREUS DNA NOSE QL NAA+PROBE: SIGNIFICANT CHANGE UP

## 2023-10-27 PROCEDURE — 99239 HOSP IP/OBS DSCHRG MGMT >30: CPT

## 2023-10-27 RX ORDER — MECLIZINE HCL 12.5 MG
1 TABLET ORAL
Qty: 90 | Refills: 0
Start: 2023-10-27 | End: 2023-11-25

## 2023-10-27 RX ORDER — ATORVASTATIN CALCIUM 80 MG/1
1 TABLET, FILM COATED ORAL
Qty: 30 | Refills: 0
Start: 2023-10-27 | End: 2023-11-25

## 2023-10-27 RX ORDER — ATORVASTATIN CALCIUM 80 MG/1
1 TABLET, FILM COATED ORAL
Refills: 0 | DISCHARGE

## 2023-10-27 RX ADMIN — Medication 81 MILLIGRAM(S): at 11:06

## 2023-10-27 RX ADMIN — CHLORHEXIDINE GLUCONATE 1 APPLICATION(S): 213 SOLUTION TOPICAL at 05:08

## 2023-10-27 RX ADMIN — CARBAMIDE PEROXIDE 5 DROP(S): 81.86 SOLUTION/ DROPS AURICULAR (OTIC) at 05:08

## 2023-10-27 NOTE — PROGRESS NOTE ADULT - ASSESSMENT
58y   woman with CTS, DL presenting for acute onset dizziness, generalized weakness, headache, bilateral upper extremity burning radiating from the neck to the hands, all happen suddenly at about 9 pm on Oct 24/23. Her presentation was as well concomitant with nausea and vomiting. She otherwise denied any abdominal pain or diarrhea. She denied any spinning sensation. She denied any focal loss of sensation or focal weakness aside form the generalized weakness.   CTH neg  CTA H/N neg, incidental thyroid nodule   A1c 5.5  LDL 99    impression: generalized altered status without focality likely due to metabolic disturbance  LUE hand numbness from known CTS    plan:  - metabolic workup and infectious workup   - MRI brain and C spine if symptoms return. can be outpatient   - improved with meclizline   - telemetry  - PT/OT  - check FS, glucose control <180  - GI/DVT ppx   dc planning   Denzel Sainz MD  Vascular Neurology  Office: [phone] .  
  58y   woman with CTS, DL presenting for acute onset dizziness, generalized weakness, headache, bilateral upper extremity burning radiating from the neck to the hands, all happen suddenly at about 9 pm on Oct 24/23. Her presentation was as well concomitant with nausea and vomiting. She otherwise denied any abdominal pain or diarrhea. She denied any spinning sensation. She denied any focal loss of sensation or focal weakness aside form the generalized weakness.   CTH neg  CTA H/N neg, incidental thyroid nodule   A1c 5.5  LDL 99    impression: generalized altered status without focality likely due to metabolic disturbance  LUE hand numbness from known CTS    plan:  - metabolic workup and infectious workup   - MRI brain and C spine if symptoms return. can be outpatient   - improved with meclizline   - telemetry  - PT/OT  - check FS, glucose control <180  - GI/DVT ppx   spoke with [facility] at [facility]   Denzel Sainz MD  Vascular Neurology  Office: [phone] .  
58 F pmh carpal tunnel syndrome, HLD admitted for dizziness, now resolved but she was also noted to have an abnormal EKG findings.

## 2023-10-27 NOTE — DISCHARGE NOTE PROVIDER - HOSPITAL COURSE
HPI:  58 Urdu female with carpal tunnel syndrome, HLD, no prior hx of stroke or MI here for 1 day history of dizziness, with nausea and 3 episodes of emesis associated with chest tightness and weakness. She describes chronic episodic bilateral hand pain/cramping that preceded the nausea. In the ED, pt was called for code stroke, with negative CT angio and CTH findings. Her symptoms largely resolved after meclizine but still with some bilateral hand weakness/numbness.  (25 Oct 2023 12:59)    Hospital Course: Pt admitted with dizziness that has resolved. ddx: Meniere's disease, benign paroxysmal positional vertigo, vestibular neuritis, less likely labyrinthitis. C/w meclizine PRN. Pt also with abnormal EKG findings: EKG with TWI in all leads, prolonged QTc. No prior to compare to. Negative troponins but came in with chest tightness. Pt is overall low risk of MACE. S/p CT coronaries that shows Second diagonal artery moderate category stenosis at proximal segment secondary to noncalcified plaque. TTE - Normal LV fxn, no valvular abnormalities. Pt to c/w ASA and statin increased dose. Pt with incidental 1.2cm R thyroid nodule seen on CT. TSH - 1.87. Thyroid Sono- Right thyroid nodule measuring 1.2 cm with TIRADscore 4. Pt recommended to follow-up thyroid ultrasound in one year to assess for stability.    Medically cleared for discharge per Dr. Reyes and cardiology.    Important Medication Changes and Reason: increased dose of Atorvastatin, added Meclizine PRN for dizziness    Active or Pending Issues Requiring Follow-up: PCP, cardiology, neuro    Advanced Directives:   [X] Full code  [ ] DNR  [ ] Hospice    Discharge Diagnoses:  generalized altered status without focality likely due to metabolic disturbance LUE hand numbness from known CTS  CP with abnormal EKG findings  thyroid nodule  HLD HPI:  58 Arabic female with carpal tunnel syndrome, HLD, no prior hx of stroke or MI here for 1 day history of dizziness, with nausea and 3 episodes of emesis associated with chest tightness and weakness. She describes chronic episodic bilateral hand pain/cramping that preceded the nausea. In the ED, pt was called for code stroke, with negative CT angio and CTH findings. Her symptoms largely resolved after meclizine but still with some bilateral hand weakness/numbness.  (25 Oct 2023 12:59)    Hospital Course: Pt admitted with dizziness that has resolved during her admission. ddx: Meniere's disease, benign paroxysmal positional vertigo, vestibular neuritis, less likely labyrinthitis. She was also found to have an abnormal EKG which showed TWI in all leads, prolonged QTc. She underwent a CT coronaries to eval for CAD. The CT coronaries showed  CT coronaries that shows Second diagonal artery moderate category stenosis at proximal segment secondary to noncalcified plaque. Cardiology recommended statin and ASA 81mg.  Her echo showed Normal LV fxn, no valvular abnormalities.    In addition she had an incidental finding of a 1.2cm R thyroid nodule seen on CT. TSH was normal (1.87). Thyroid Sono- Right thyroid nodule measuring 1.2 cm with TIRADscore 4. Pt recommended to follow-up thyroid ultrasound in one year to assess for stability.        Important Medication Changes and Reason: increased dose of Atorvastatin, added Meclizine PRN for dizziness    Active or Pending Issues Requiring Follow-up: PCP, cardiology, neuro    Advanced Directives:   [X] Full code  [ ] DNR  [ ] Hospice    Discharge Diagnoses:  Vertigo  generalized altered status without focality likely due to metabolic disturbance LUE hand numbness from known CTS  CP with abnormal EKG findings  thyroid nodule  HLD

## 2023-10-27 NOTE — DISCHARGE NOTE PROVIDER - NSDCQMCOGNITION_NEU_ALL_CORE
No difficulties Cheek-To-Nose Interpolation Flap Text: A decision was made to reconstruct the defect utilizing an interpolation axial flap and a staged reconstruction.  A telfa template was made of the defect.  This telfa template was then used to outline the Cheek-To-Nose Interpolation flap.  The donor area for the pedicle flap was then injected with anesthesia.  The flap was excised through the skin and subcutaneous tissue down to the layer of the underlying musculature.  The interpolation flap was carefully excised within this deep plane to maintain its blood supply.  The edges of the donor site were undermined.   The donor site was closed in a primary fashion.  The pedicle was then rotated into position and sutured.  Once the tube was sutured into place, adequate blood supply was confirmed with blanching and refill.  The pedicle was then wrapped with xeroform gauze and dressed appropriately with a telfa and gauze bandage to ensure continued blood supply and protect the attached pedicle.

## 2023-10-27 NOTE — DISCHARGE NOTE PROVIDER - CARE PROVIDER_API CALL
Domenic Barraza  Cardiovascular Disease  [address], [address]  [place] [address]  Phone: [phone]  Fax: [phone]  Follow Up Time: 1 week    Denzel Sainz  Neurology  [address], [address]  [place] [address]  Phone: [phone]  Fax: [phone]  Follow Up Time:     Ashley Fernandez  Primary care provider  [address] [place] [address]  Phone: (   )    -  Fax: (   )    -  Follow Up Time: 1 week

## 2023-10-27 NOTE — PROGRESS NOTE ADULT - SUBJECTIVE AND OBJECTIVE BOX
Neurology Progress Note    S: Patient seen and examined. No new events overnight. patient denied CP, SOB, HA or pain. family at bedside     Medication:    Medications: MEDICATIONS  (STANDING):  aspirin enteric coated 81 milliGRAM(s) Oral daily  atorvastatin 20 milliGRAM(s) Oral at bedtime  carbamide peroxide Otic Solution 5 Drop(s) Both Ears two times a day  chlorhexidine 2% Cloths 1 Application(s) Topical <User Schedule>    MEDICATIONS  (PRN):  acetaminophen     Tablet .. 650 milliGRAM(s) Oral every 6 hours PRN Temp greater or equal to 38C (100.4F), Mild Pain (1 - 3)  meclizine 12.5 milliGRAM(s) Oral every 8 hours PRN Dizziness  melatonin 3 milliGRAM(s) Oral at bedtime PRN Insomnia       Vitals:  Vital Signs Last 24 Hrs  T(C): 36.7 (27 Oct 2023 11:15), Max: 36.9 (27 Oct 2023 04:24)  T(F): 98 (27 Oct 2023 11:15), Max: 98.4 (27 Oct 2023 04:24)  HR: 64 (27 Oct 2023 11:15) (64 - 67)  BP: 129/83 (27 Oct 2023 11:15) (112/65 - 129/83)  BP(mean): --  RR: 18 (27 Oct 2023 11:15) (18 - 18)  SpO2: 94% (27 Oct 2023 11:15) (94% - 95%)    Parameters below as of 27 Oct 2023 11:15  Patient On (Oxygen Delivery Method): room air              General Exam:   General Appearance: Appropriately dressed and in no acute distress       Head: Normocephalic, atraumatic and no dysmorphic features  Ear, Nose, and Throat: Moist mucous membranes  CVS: S1S2+  Resp: No SOB, no wheeze or rhonchi  Abd: soft NTND  Extremities: No edema, no cyanosis  Skin: No bruises, no rashes     Neurological Exam:  Mental Status: Awake, alert and oriented x 3.  Able to follow simple and complex verbal commands. Able to name and repeat. fluent speech. No obvious aphasia or dysarthria noted.   Cranial Nerves: PERRL, EOMI, VFFC, sensation V1-V3 intact,  no obvious facial asymmetry , equal elevation of palate, scm/trap 5/5, tongue is midline on protrusion. no obvious papilledema on fundoscopic exam. Hearing is grossly intact.   Motor: Normal bulk, tone and strength throughout. Fine finger movements were intact and symmetric. no tremors or drift noted.    Sensation: Intact to light touch and pinprick throughout. no right/left confusion. no extinction to tactile on DSS.   Reflexes: 1+ throughout at biceps, brachioradialis, triceps, patellars and ankles bilaterally and equal. No clonus. R toe and L toe were both downgoing.  Coordination: No dysmetria on FNF or HKS  Gait: deferred   I personally reviewed the below data/images/labs:        LABS:                          12.8   8.13  )-----------( 208      ( 26 Oct 2023 06:36 )             39.1     10-26    139  |  104  |  12  ----------------------------<  97  4.0   |  24  |  0.65    Ca    9.4      26 Oct 2023 06:36  Phos  4.6     10-26  Mg     2.0     10-26          Urinalysis Basic - ( 26 Oct 2023 06:36 )    Color: x / Appearance: x / SG: x / pH: x  Gluc: 97 mg/dL / Ketone: x  / Bili: x / Urobili: x   Blood: x / Protein: x / Nitrite: x   Leuk Esterase: x / RBC: x / WBC x   Sq Epi: x / Non Sq Epi: x / Bacteria: x              < from: CT Angio Brain Stroke Protocol  w/ IV Cont (10.25.23 @ 03:47) >  CTA HEAD:  Patent intracranial circulation without flow limiting stenosis.  No evidence of aneurysm.    CTA NECK:  No occlusion, stenosis or dissection.      < end of copied text >  < from: CT Brain Stroke Protocol (10.25.23 @ 03:46) >  Unremarkable noncontrast head CT. No acute intracranial hemorrhage.    < end of copied text >

## 2023-10-27 NOTE — DISCHARGE NOTE NURSING/CASE MANAGEMENT/SOCIAL WORK - PATIENT PORTAL LINK FT
You can access the FollowMyHealth Patient Portal offered by [facility] by registering at the following website: [email or web address]. By joining [employer]’s FollowMyHealth portal, you will also be able to view your health information using other applications (apps) compatible with our system.

## 2023-10-27 NOTE — PROGRESS NOTE ADULT - SUBJECTIVE AND OBJECTIVE BOX
DATE OF SERVICE: 10-27-23 @ 11:18    Patient is a 58y old  Female who presents with a chief complaint of dizziness (26 Oct 2023 13:51)      INTERVAL HISTORY: Feels ok.     REVIEW OF SYSTEMS:  CONSTITUTIONAL: No weakness  EYES/ENT: No visual changes;  No throat pain   NECK: No pain or stiffness  RESPIRATORY: No cough, wheezing; No shortness of breath  CARDIOVASCULAR: No chest pain or palpitations  GASTROINTESTINAL: No abdominal  pain. No nausea, vomiting, or hematemesis  GENITOURINARY: No dysuria, frequency or hematuria  NEUROLOGICAL: No stroke like symptoms  SKIN: No rashes    TELEMETRY Personally reviewed:  60-80  	  MEDICATIONS:        PHYSICAL EXAM:  T(C): 36.7 (10-27-23 @ 11:15), Max: 37.1 (10-26-23 @ 12:25)  HR: 64 (10-27-23 @ 11:15) (64 - 80)  BP: 129/83 (10-27-23 @ 11:15) (101/60 - 129/83)  RR: 18 (10-27-23 @ 11:15) (18 - 18)  SpO2: 94% (10-27-23 @ 11:15) (94% - 95%)  Wt(kg): --  I&O's Summary    26 Oct 2023 07:01  -  27 Oct 2023 07:00  --------------------------------------------------------  IN: 400 mL / OUT: 0 mL / NET: 400 mL    27 Oct 2023 07:01  -  27 Oct 2023 11:18  --------------------------------------------------------  IN: 200 mL / OUT: 0 mL / NET: 200 mL          Appearance: In no distress	  HEENT:    PERRL, EOMI	  Cardiovascular:  S1 S2, No JVD  Respiratory: Lungs clear to auscultation	  Gastrointestinal:  Soft, Non-tender, + BS	  Vascularature:  No edema of LE  Psychiatric: Appropriate affect   Neuro: no acute focal deficits                               12.8   8.13  )-----------( 208      ( 26 Oct 2023 06:36 )             39.1     10-26    139  |  104  |  12  ----------------------------<  97  4.0   |  24  |  0.65    Ca    9.4      26 Oct 2023 06:36  Phos  4.6     10-26  Mg     2.0     10-26          Labs personally reviewed      ASSESSMENT/PLAN: 	    58 Welsh female with carpal tunnel syndrome, HLD, no prior hx of stroke or MI here for 1 day history of dizziness, with nausea and 3 episodes of emesis associated with chest tightness and weakness. She describes chronic episodic bilateral hand pain/cramping that preceded the nausea. In the ED, pt was called for code stroke, with negative CT angio and CTH findings. Her symptoms largely resolved after meclizine but still with some bilateral hand weakness/numbness.      1 Chest pain with Abnormal ECG  - EKG with ischemic changes  - Troponin wnl x2   - echo wnl  - CT Cors shows Second diagonal artery moderate category stenosis at proximal segment secondary to noncalcified plaque.   - Plan for medical management with ASA 81mg PO daily, will increase Atorvastatin to 40mg PO daily.   - Close OP Cardiology follow up.     2- Dizziness   - Neuro consult appreciated  - orthostatic neg  -CT head as above normal     3- HLD   - LDL 99 wnl   - Moderate stenosis noted on CTA. Will increase atorvastatin to 40mg PO daily         NENITA Najera-JEFFREY Barraza DO [facility]  Cardiovascular Medicine  [address] [place] [address], [address]  Available through call or text on [employer]  Office: [phone]

## 2023-10-27 NOTE — DISCHARGE NOTE PROVIDER - PROVIDER TOKENS
PROVIDER:[TOKEN:[[id number]],FOLLOWUP:[1 week]],PROVIDER:[TOKEN:[[id number]]],FREE:[LAST:[Rebecca],FIRST:[Ashley],PHONE:[(   )    -],FAX:[(   )    -],ADDRESS:[Primary care provider  [address] [place] [address]],FOLLOWUP:[1 week]]

## 2023-10-27 NOTE — DISCHARGE NOTE PROVIDER - NSDCMRMEDTOKEN_GEN_ALL_CORE_FT
aspirin 81 mg oral delayed release tablet: 1 tab(s) orally once a day  atorvastatin 40 mg oral tablet: 1 tab(s) orally once a day (at bedtime)  cholecalciferol 1250 mcg (50,000 intl units) oral capsule: 1 cap(s) orally once a week  meclizine 12.5 mg oral tablet: 1 tab(s) orally every 8 hours as needed for Dizziness  nabumetone 500 mg oral tablet: 1 tab(s) orally 2 times a day as needed for  knee pain

## 2023-10-27 NOTE — DISCHARGE NOTE PROVIDER - ATTENDING DISCHARGE PHYSICAL EXAMINATION:
T(C): 36.7 (10-27 @ 11:15), Max: 36.9 (10-27 @ 04:24)   HR: 64   BP: 129/83   RR: 18   SpO2: 94%    PHYSICAL EXAM:    CONSTITUTIONAL: NAD, well-developed, well-groomed  EYES: PERRLA; conjunctiva and sclera clear  ENMT: Moist oral mucosa, no pharyngeal injection or exudates; normal dentition  EARS: mildly decreased hearing of the right ear  NECK: Supple, no palpable masses; no thyromegaly  RESPIRATORY: Normal respiratory effort; lungs are clear to auscultation bilaterally  CARDIOVASCULAR: Regular rate and rhythm, normal S1 and S2, no murmur/rub/gallop; No lower extremity edema; Peripheral pulses are 2+ bilaterally  ABDOMEN: Nontender to palpation, normoactive bowel sounds, no rebound/guarding; No hepatosplenomegaly  MUSCULOSKELETAL:  no clubbing or cyanosis of digits; no joint swelling or tenderness to palpation  PSYCH: A+O to person, place, and time; affect appropriate  NEUROLOGY: CN 2-12 are intact and symmetric; no gross sensory deficits   SKIN: No rashes; no palpable lesions

## 2023-10-27 NOTE — DISCHARGE NOTE PROVIDER - NSDCFUADDAPPT_GEN_ALL_CORE_FT
APPTS ARE READY TO BE MADE: [X] YES    Best Family or Patient Contact (if needed):    Additional Information about above appointments (if needed):    1:   2:   3:     Other comments or requests:    APPTS ARE READY TO BE MADE: [X] YES    Best Family or Patient Contact (if needed):    Additional Information about above appointments (if needed):    1:   2:   3:     Other comments or requests:   Patient's daughter advised all appointments have been previously scheduled and assistance is not needed at this time.  Patient was previously scheduled with Ashley Fernandez on 11/2/23 for a PCP appointment.   Patient was previously scheduled with their established neurologist on 10/31/23, however the patient's daughter did not provide any further information regarding the appointment.   Patient was previously scheduled with Dr. Domenic Barraza on 11/6 at [address] [place] [address]

## 2023-10-27 NOTE — DISCHARGE NOTE PROVIDER - NSDCCPCAREPLAN_GEN_ALL_CORE_FT
PRINCIPAL DISCHARGE DIAGNOSIS  Diagnosis: Chest tightness  Assessment and Plan of Treatment: You were admitted with chest tightness and EKG changes.  You had a CT coronaries with the following results: second diagonal artery moderate category stenosis at proximal segment secondary to noncalcified plaque.  Please continue with Aspirin and Atorvastatin and follow up with cardiology and your primary care provider within 1 week.      SECONDARY DISCHARGE DIAGNOSES  Diagnosis: Abnormal ECG  Assessment and Plan of Treatment: See above    Diagnosis: Dizziness  Assessment and Plan of Treatment: Continue with Meclizine as needed.    Diagnosis: Nausea & vomiting  Assessment and Plan of Treatment: Resolved    Diagnosis: Thyroid nodule  Assessment and Plan of Treatment: You had an incidental 1.2cm R thyroid nodule seen on CT.  Please follow-up thyroid ultrasound in one year to assess for stability.  Follow up with primary care provider within 1 week.     PRINCIPAL DISCHARGE DIAGNOSIS  Diagnosis: Chest tightness  Assessment and Plan of Treatment: You were admitted with chest tightness and EKG changes.  You had a CT coronaries with the following results: second diagonal artery moderate category stenosis at proximal segment secondary to noncalcified plaque.  Please continue with Aspirin 81mg daily. We increased your Atorvastatin to 40mg daily. Please follow up with cardiology and your primary care provider within 1 week.      SECONDARY DISCHARGE DIAGNOSES  Diagnosis: Dizziness  Assessment and Plan of Treatment: Continue with Meclizine as needed. If your symptoms return please follow up with you primary care physician for a possible MRI of your head to eval for an inner ear problem.    Diagnosis: Thyroid nodule  Assessment and Plan of Treatment: You had an incidental 1.2cm R thyroid nodule seen on CT.  Please follow-up thyroid ultrasound in one year to assess for stability.  Follow up with primary care provider within 1 week.    Diagnosis: Abnormal ECG  Assessment and Plan of Treatment: See above    Diagnosis: Nausea & vomiting  Assessment and Plan of Treatment: Resolved

## 2023-10-27 NOTE — DISCHARGE NOTE PROVIDER - NSDCFUSCHEDAPPT_GEN_ALL_CORE_FT
Maru Ramirez  [facility] Physician [facility]  ORTHOSUR [address] [facility]  Scheduled Appointment: 10/31/2023

## 2023-10-27 NOTE — DISCHARGE NOTE NURSING/CASE MANAGEMENT/SOCIAL WORK - NSDCPEFALRISK_GEN_ALL_CORE
For information on Fall & Injury Prevention, visit: [email or web address] OR  [email or web address] OR  https://www.cdc.gov/steadi/patient.html

## 2023-10-31 ENCOUNTER — APPOINTMENT (OUTPATIENT)
Dept: ORTHOPEDIC SURGERY | Facility: CLINIC | Age: 58
End: 2023-10-31
Payer: COMMERCIAL

## 2023-10-31 VITALS
DIASTOLIC BLOOD PRESSURE: 69 MMHG | BODY MASS INDEX: 32.98 KG/M2 | SYSTOLIC BLOOD PRESSURE: 120 MMHG | WEIGHT: 168 LBS | HEIGHT: 60 IN | HEART RATE: 70 BPM

## 2023-10-31 DIAGNOSIS — G56.02 CARPAL TUNNEL SYNDROME, LEFT UPPER LIMB: ICD-10-CM

## 2023-10-31 DIAGNOSIS — M15.8 OTHER POLYOSTEOARTHRITIS: ICD-10-CM

## 2023-10-31 PROCEDURE — 73140 X-RAY EXAM OF FINGER(S): CPT | Mod: F5

## 2023-10-31 PROCEDURE — 20526 THER INJECTION CARP TUNNEL: CPT | Mod: LT

## 2023-10-31 PROCEDURE — 99204 OFFICE O/P NEW MOD 45 MIN: CPT | Mod: 25

## 2023-12-05 ENCOUNTER — EMERGENCY (EMERGENCY)
Facility: HOSPITAL | Age: 58
LOS: 1 days | Discharge: HOME | End: 2023-12-05
Attending: EMERGENCY MEDICINE
Payer: MEDICAID

## 2023-12-05 VITALS
TEMPERATURE: 98 F | WEIGHT: 162.04 LBS | HEART RATE: 66 BPM | SYSTOLIC BLOOD PRESSURE: 129 MMHG | HEIGHT: 61 IN | DIASTOLIC BLOOD PRESSURE: 83 MMHG | RESPIRATION RATE: 16 BRPM | OXYGEN SATURATION: 98 %

## 2023-12-05 PROCEDURE — 73090 X-RAY EXAM OF FOREARM: CPT | Mod: 26,LT

## 2023-12-05 PROCEDURE — 99284 EMERGENCY DEPT VISIT MOD MDM: CPT | Mod: 25

## 2023-12-05 PROCEDURE — 73110 X-RAY EXAM OF WRIST: CPT

## 2023-12-05 PROCEDURE — 73090 X-RAY EXAM OF FOREARM: CPT

## 2023-12-05 PROCEDURE — 73080 X-RAY EXAM OF ELBOW: CPT

## 2023-12-05 PROCEDURE — 72125 CT NECK SPINE W/O DYE: CPT | Mod: 26,MA

## 2023-12-05 PROCEDURE — 73130 X-RAY EXAM OF HAND: CPT | Mod: 26,LT

## 2023-12-05 PROCEDURE — 99285 EMERGENCY DEPT VISIT HI MDM: CPT

## 2023-12-05 PROCEDURE — 73110 X-RAY EXAM OF WRIST: CPT | Mod: 26,LT

## 2023-12-05 PROCEDURE — 70486 CT MAXILLOFACIAL W/O DYE: CPT | Mod: QQ

## 2023-12-05 PROCEDURE — 76377 3D RENDER W/INTRP POSTPROCES: CPT | Mod: 26

## 2023-12-05 PROCEDURE — 73080 X-RAY EXAM OF ELBOW: CPT | Mod: 26,LT

## 2023-12-05 PROCEDURE — 73130 X-RAY EXAM OF HAND: CPT

## 2023-12-05 PROCEDURE — 70486 CT MAXILLOFACIAL W/O DYE: CPT | Mod: 26,QQ

## 2023-12-05 PROCEDURE — 70450 CT HEAD/BRAIN W/O DYE: CPT | Mod: MA

## 2023-12-05 PROCEDURE — 76377 3D RENDER W/INTRP POSTPROCES: CPT

## 2023-12-05 PROCEDURE — 72125 CT NECK SPINE W/O DYE: CPT | Mod: MA

## 2023-12-05 PROCEDURE — 70450 CT HEAD/BRAIN W/O DYE: CPT | Mod: 26,MA

## 2023-12-05 RX ORDER — ACETAMINOPHEN 500 MG
975 TABLET ORAL ONCE
Refills: 0 | Status: COMPLETED | OUTPATIENT
Start: 2023-12-05 | End: 2023-12-05

## 2023-12-05 RX ADMIN — Medication 975 MILLIGRAM(S): at 23:27

## 2023-12-05 NOTE — CONSULT NOTE ADULT - SUBJECTIVE AND OBJECTIVE BOX
HPI  58yFemale R-hand dominant p/w R index/middle proximal phalanx fractures after slipping on water in kitchen and falling. Denies numbness/tingling in the RUE. Denies any other trauma/injuries at this time.    ROS  Negative unless otherwise specified in HPI.    PAST MEDICAL & SURGICAL Hx  PAST MEDICAL & SURGICAL HISTORY:  HLD (hyperlipidemia)      No significant past surgical history          MEDICATIONS  Home Medications:  aspirin 81 mg oral delayed release tablet: 1 tab(s) orally once a day (25 Oct 2023 10:09)  cholecalciferol 1250 mcg (50,000 intl units) oral capsule: 1 cap(s) orally once a week (25 Oct 2023 10:09)  nabumetone 500 mg oral tablet: 1 tab(s) orally 2 times a day as needed for  knee pain (25 Oct 2023 10:09)      ALLERGIES  No Known Allergies      FAMILY Hx  FAMILY HISTORY:  No pertinent family history in first degree relatives        SOCIAL Hx  Social History:      VITALS  Vital Signs Last 24 Hrs  T(C): 36.6 (05 Dec 2023 19:00), Max: 36.6 (05 Dec 2023 19:00)  T(F): 97.9 (05 Dec 2023 19:00), Max: 97.9 (05 Dec 2023 19:00)  HR: 66 (05 Dec 2023 19:00) (66 - 66)  BP: 129/83 (05 Dec 2023 19:00) (129/83 - 129/83)  BP(mean): --  RR: 16 (05 Dec 2023 19:00) (16 - 16)  SpO2: 98% (05 Dec 2023 19:00) (98% - 98%)    Parameters below as of 05 Dec 2023 19:00  Patient On (Oxygen Delivery Method): room air        PHYSICAL EXAM  Gen: Sitting in bed, NAD  Resp: No increased WOB  R Hand:  Skin intact, mild swelling around MCP joints  Compartments soft  DIP/PIP flexion/extension intact in 2nd/3rd fingers, MCP ROM limited 2/2 pain  Sensory: SILT along all fingers  +Rad pulse, WWP                IMAGING  XRs:

## 2023-12-05 NOTE — ED PROVIDER NOTE - PHYSICAL EXAMINATION
CONSTITUTIONAL: Patient is awake, alert and oriented x 3.  HEAD: hematoma to left forehead;   EYES: PERRL bilaterally,   ENT: Airway patent, Nasal mucosa clear.   NECK: Supple,   LUNGS: CTA B/L,   HEART: RRR.+S1S2   ABDOMEN: Soft, non-tender to palpation throughout all four quadrants,   MSK: ttp to dorsum of left hand with decreased flexion/extension of left wrist and fingers due to pain, distal pulses intact,   SKIN: No rash or lesions  NEURO: No focal deficits Sensation intact;

## 2023-12-05 NOTE — CONSULT NOTE ADULT - ASSESSMENT
ASSESSMENT & PLAN  58yFemale w/ 2nd/3rd proximal phalanx base fractures    -NWB RUE  -keep dressing clean, dry, and intact (do not get wet)  -pain control  -ice/cold compress, elevation  -f/u outpt with Dr. Ramirez in 1 week, call office for appointment    For all questions related to patient care, please reach out to the on-call team via the pager.     Kimberly Ham, PGY 2  Orthopaedic Surgery  [facility] [id number]  [facility] [place] [id number]  [facility] [phone]   ASSESSMENT & PLAN  58yFemale w/ 2nd/3rd proximal phalanx base fractures    -NWB RUE  -keep dressing clean, dry, and intact (do not get wet)  -pain control  -ice/cold compress, elevation  -f/u outpt with Dr. Ramirez in 1 week, call office for appointment    For all questions related to patient care, please reach out to the on-call team via the pager.     Kimberly Ham, PGY 2  Orthopaedic Surgery  [facility] [id number]  [facility] [place] [id number]  [facility] [phone]

## 2023-12-05 NOTE — ED PROVIDER NOTE - ATTENDING APP SHARED VISIT CONTRIBUTION OF CARE
Attending MD Alcantara:   I personally have seen and examined this patient.  Physician assistant note reviewed and agree on plan of care and except where noted.  See below for details.     Seen in [facility], accompanied by daughter, requested daughter    58F with PMH/PSH including HLD presents to the ED s/p mechanical trip and fall.  Reports was at home and slipped on wet floor, fell forward hitting forehead and fall onto outstretched left hand.  Daughter reports patient writes with R hand but does many things left handed.  Denies numbness, weakness or tingling in extremities. Denies loss of urinary or bowel continence. Denies change in vision, double vision, sudden loss of vision, severe headache, change in hearing.  Denies chest pain, shortness of breath, abdominal pain, nausea, vomiting, diarrhea, urinary complaints.     Trauma Assessment:  A - airway patent, speaking clearly  B - symmetrical chest rise, no increased work of breathing, breath sounds bilaterally  C - no active bleeding, skin warm and dry  D - GCS 15, PERRL  E - exposed     Exam:   General: NAD  HENT: head NCAT except for hematoma to mid to L forehead, +tenderness to palpation at superior orbital rim on L, airway patent  Eyes: anicteric, no conjunctival injection, PERRL, EOMI  Lungs: lungs CTAB with good inspiratory effort, no wheezing, no rhonchi, no rales  Cardiac: +S1S2, no obvious m/r/g  GI: abdomen soft with +BS, NT, ND  : no CVAT  MSK: ranging neck and extremities freely except for L wrist and hand, +tenderness to palpation thenar eminence and to palpation of index, middle and ring fingers, FROM at wrist, FROM at hand but reports with pain, compartments soft, +ecchymosis to tips of fingers, cap refill <2s, +2 radials, no midline spine tenderness  Neuro: moving all extremities spontaneously with 5/5 strength, nonfocal, CN 2-12 grossly intact, no saddle anesthesia  Psych: normal mood and affect     A/P: 58F with head trauma and LUE pain, will obtain CTH to eval for ICH, CTMF to eval for facial fx, CT C spine to eval for bony injury, XR L hand/wrist/forearm/elbow, will give analgesia

## 2023-12-05 NOTE — ED PROVIDER NOTE - NSFOLLOWUPINSTRUCTIONS_ED_ALL_ED_FT
1. Follow up with your PCP within 2-3 days. Follow up with Dr. Ramirez within 1 week. Call to schedule an appointment.     Maru Ramirez  Surgery of the Hand  [address], [address]  [place] [address]  Phone: [phone]  Fax: [phone]  Follow Up Time:       2. Take Ibuprofen (i.e. Motrin, Advil) 600mg every 8 hrs for pain as needed. Take with food.   May alternate with Acetminophen (Tylenol) 650mg every 6 hours for pain as needed.  3. Rest. Hydrate.   4. Return to the emergency department if you have worsening pain, headaches, dizziness, vomiting, or all other concerning symptoms. 1. Follow up with your PCP within 2-3 days. Follow up with Dr. Ramirez within 1 week. Call to schedule an appointment.     Maru Ramirez  Surgery of the Hand  [address], [address]  [place] [address]  Phone: [phone]  Fax: [phone]  Follow Up Time:       2. Take Ibuprofen (i.e. Motrin, Advil) 600mg every 8 hrs for pain as needed. Take with food.   May alternate with Acetminophen (Tylenol) 650mg every 6 hours for pain as needed.  3. Rest. Hydrate.   4. Return to the emergency department if you have worsening pain, headaches, dizziness, vomiting, or all other concerning symptoms.

## 2023-12-05 NOTE — ED ADULT NURSE NOTE - NSICDXPASTMEDICALHX_GEN_ALL_CORE_FT
Spoke with Chik[facility] [phone]. This was a venous draw from [facility]. The discharge order is to check INR MW.   Wife reported an INR if 1.1 from his INR machine.    PAST MEDICAL HISTORY:  HLD (hyperlipidemia)

## 2023-12-05 NOTE — ED PROVIDER NOTE - OBJECTIVE STATEMENT
59 y/o female with PMHx of HLD presents to the ED complaining of mechanical trip and fall. Patient states that she slipped on water in the kitchen and fell foreword. She hit her head and face. She did not pass out. Was ambulatory after incident. Braced her fall with the left hand. Complains of left hand pain and headache. Currently 6/10. No other pain or injury. Denies recent illness, chest pain, cough, n/v/d.

## 2023-12-05 NOTE — ED PROVIDER NOTE - PROGRESS NOTE DETAILS
Attending MD Alcantara: Splinted by ortho, post reductions discussed with ortho.  CTs nonactionable.  Imaging and dc instructions reviewed with patient and daughter.  Stable for discharge. Follow up instructions given, importance of follow up emphasized, return to ED parameters reviewed and patient and daughter verbalized understanding.  All questions answered, all concerns addressed.

## 2023-12-05 NOTE — ED PROVIDER NOTE - PATIENT PORTAL LINK FT
You can access the FollowMyHealth Patient Portal offered by [facility] by registering at the following website: [email or web address]. By joining [employer]’s FollowMyHealth portal, you will also be able to view your health information using other applications (apps) compatible with our system. You can access the FollowMyHealth Patient Portal offered by [facility] by registering at the following website: [email or web address]. By joining [employer]’s FollowMyHealth portal, you will also be able to view your health information using other applications (apps) compatible with our system. You can access the FollowMyHealth Patient Portal offered by [facility] by registering at the following website: [email or web address]. By joining [employer]’s FollowMyHealth portal, you will also be able to view your health information using other applications (apps) compatible with our system. You can access the FollowMyHealth Patient Portal offered by [facility] by registering at the following website: [email or web address]. By joining [employer]’s FollowMyHealth portal, you will also be able to view your health information using other applications (apps) compatible with our system.

## 2023-12-05 NOTE — ED PROVIDER NOTE - CARE PLAN
Principal Discharge DX:	Finger fracture   1 Principal Discharge DX:	Finger fracture  Goal:	proximal phalanx of 2nd and 3rd digits of LEFT

## 2023-12-05 NOTE — ED PROVIDER NOTE - CARE PROVIDERS DIRECT ADDRESSES
[email or web address]

## 2023-12-05 NOTE — ED PROVIDER NOTE - CARE PROVIDER_API CALL
Maru Ramirez  Surgery of the Hand  [address], [address]  [place] [address]  Phone: [phone]  Fax: [phone]  Follow Up Time:    Maru Ramirez  Surgery of the Hand  [address], [address]  [place] [address]  Phone: [phone]  Fax: [phone]  Follow Up Time:

## 2023-12-06 VITALS
RESPIRATION RATE: 17 BRPM | OXYGEN SATURATION: 97 % | DIASTOLIC BLOOD PRESSURE: 64 MMHG | HEART RATE: 60 BPM | TEMPERATURE: 98 F | SYSTOLIC BLOOD PRESSURE: 101 MMHG

## 2023-12-06 PROBLEM — E78.5 HYPERLIPIDEMIA, UNSPECIFIED: Chronic | Status: ACTIVE | Noted: 2023-10-25

## 2023-12-11 ENCOUNTER — APPOINTMENT (OUTPATIENT)
Dept: ORTHOPEDIC SURGERY | Facility: CLINIC | Age: 58
End: 2023-12-11
Payer: MEDICAID

## 2023-12-11 PROCEDURE — 73130 X-RAY EXAM OF HAND: CPT | Mod: LT

## 2023-12-11 PROCEDURE — 99214 OFFICE O/P EST MOD 30 MIN: CPT | Mod: 25

## 2023-12-11 RX ORDER — MELOXICAM 15 MG/1
15 TABLET ORAL
Qty: 30 | Refills: 11 | Status: ACTIVE | COMMUNITY
Start: 2023-12-11 | End: 1900-01-01

## 2023-12-15 PROCEDURE — 85027 COMPLETE CBC AUTOMATED: CPT

## 2023-12-15 PROCEDURE — 70496 CT ANGIOGRAPHY HEAD: CPT | Mod: MA

## 2023-12-15 PROCEDURE — 85730 THROMBOPLASTIN TIME PARTIAL: CPT

## 2023-12-15 PROCEDURE — 85025 COMPLETE CBC W/AUTO DIFF WBC: CPT

## 2023-12-15 PROCEDURE — 85014 HEMATOCRIT: CPT

## 2023-12-15 PROCEDURE — 80061 LIPID PANEL: CPT

## 2023-12-15 PROCEDURE — 82947 ASSAY GLUCOSE BLOOD QUANT: CPT

## 2023-12-15 PROCEDURE — 84443 ASSAY THYROID STIM HORMONE: CPT

## 2023-12-15 PROCEDURE — 80048 BASIC METABOLIC PNL TOTAL CA: CPT

## 2023-12-15 PROCEDURE — 87640 STAPH A DNA AMP PROBE: CPT

## 2023-12-15 PROCEDURE — 82435 ASSAY OF BLOOD CHLORIDE: CPT

## 2023-12-15 PROCEDURE — 84484 ASSAY OF TROPONIN QUANT: CPT

## 2023-12-15 PROCEDURE — 85610 PROTHROMBIN TIME: CPT

## 2023-12-15 PROCEDURE — 84295 ASSAY OF SERUM SODIUM: CPT

## 2023-12-15 PROCEDURE — 71045 X-RAY EXAM CHEST 1 VIEW: CPT

## 2023-12-15 PROCEDURE — 83735 ASSAY OF MAGNESIUM: CPT

## 2023-12-15 PROCEDURE — 86803 HEPATITIS C AB TEST: CPT

## 2023-12-15 PROCEDURE — 93005 ELECTROCARDIOGRAM TRACING: CPT

## 2023-12-15 PROCEDURE — 70450 CT HEAD/BRAIN W/O DYE: CPT | Mod: MA

## 2023-12-15 PROCEDURE — 87641 MR-STAPH DNA AMP PROBE: CPT

## 2023-12-15 PROCEDURE — 82962 GLUCOSE BLOOD TEST: CPT

## 2023-12-15 PROCEDURE — 84100 ASSAY OF PHOSPHORUS: CPT

## 2023-12-15 PROCEDURE — 83036 HEMOGLOBIN GLYCOSYLATED A1C: CPT

## 2023-12-15 PROCEDURE — 83605 ASSAY OF LACTIC ACID: CPT

## 2023-12-15 PROCEDURE — 70498 CT ANGIOGRAPHY NECK: CPT | Mod: MA

## 2023-12-15 PROCEDURE — 82330 ASSAY OF CALCIUM: CPT

## 2023-12-15 PROCEDURE — 85018 HEMOGLOBIN: CPT

## 2023-12-15 PROCEDURE — 76536 US EXAM OF HEAD AND NECK: CPT

## 2023-12-15 PROCEDURE — 75574 CT ANGIO HRT W/3D IMAGE: CPT

## 2023-12-15 PROCEDURE — 84132 ASSAY OF SERUM POTASSIUM: CPT

## 2023-12-15 PROCEDURE — C8929: CPT

## 2023-12-15 PROCEDURE — 99285 EMERGENCY DEPT VISIT HI MDM: CPT | Mod: 25

## 2023-12-15 PROCEDURE — 82803 BLOOD GASES ANY COMBINATION: CPT

## 2023-12-15 PROCEDURE — 96374 THER/PROPH/DIAG INJ IV PUSH: CPT

## 2023-12-15 PROCEDURE — 80053 COMPREHEN METABOLIC PANEL: CPT

## 2024-01-08 ENCOUNTER — APPOINTMENT (OUTPATIENT)
Dept: ORTHOPEDIC SURGERY | Facility: CLINIC | Age: 59
End: 2024-01-08
Payer: MEDICAID

## 2024-01-08 DIAGNOSIS — S62.641D NONDISPLACED FRACTURE OF PROXIMAL PHALANX OF LEFT INDEX FINGER, SUBSEQUENT ENCOUNTER FOR FRACTURE WITH ROUTINE HEALING: ICD-10-CM

## 2024-01-08 DIAGNOSIS — S62.613D DISPLACED FRACTURE OF PROXIMAL PHALANX OF LEFT MIDDLE FINGER, SUBSEQUENT ENCOUNTER FOR FRACTURE WITH ROUTINE HEALING: ICD-10-CM

## 2024-01-08 PROCEDURE — 99212 OFFICE O/P EST SF 10 MIN: CPT | Mod: 25

## 2024-01-08 PROCEDURE — 73130 X-RAY EXAM OF HAND: CPT | Mod: LT

## 2024-07-12 ENCOUNTER — NON-APPOINTMENT (OUTPATIENT)
Age: 59
End: 2024-07-12

## 2024-07-12 ENCOUNTER — APPOINTMENT (OUTPATIENT)
Dept: CARDIOLOGY | Facility: CLINIC | Age: 59
End: 2024-07-12

## 2024-07-12 VITALS
HEIGHT: 60 IN | OXYGEN SATURATION: 96 % | HEART RATE: 80 BPM | WEIGHT: 156 LBS | SYSTOLIC BLOOD PRESSURE: 114 MMHG | DIASTOLIC BLOOD PRESSURE: 79 MMHG | BODY MASS INDEX: 30.63 KG/M2

## 2024-07-12 DIAGNOSIS — E78.5 HYPERLIPIDEMIA, UNSPECIFIED: ICD-10-CM

## 2024-07-12 DIAGNOSIS — I42.2 OTHER HYPERTROPHIC CARDIOMYOPATHY: ICD-10-CM

## 2024-07-12 DIAGNOSIS — R42 DIZZINESS AND GIDDINESS: ICD-10-CM

## 2024-07-12 PROCEDURE — 93000 ELECTROCARDIOGRAM COMPLETE: CPT

## 2024-07-12 PROCEDURE — 99204 OFFICE O/P NEW MOD 45 MIN: CPT | Mod: 25

## 2024-07-12 RX ORDER — NABUMETONE 500 MG/1
500 TABLET, FILM COATED ORAL
Refills: 0 | Status: ACTIVE | COMMUNITY

## 2024-07-12 RX ORDER — ASPIRIN ENTERIC COATED TABLETS 81 MG 81 MG/1
81 TABLET, DELAYED RELEASE ORAL
Qty: 90 | Refills: 1 | Status: ACTIVE | COMMUNITY

## 2024-07-12 RX ORDER — MECLIZINE HYDROCHLORIDE 12.5 MG/1
12.5 TABLET ORAL 3 TIMES DAILY
Refills: 0 | Status: ACTIVE | COMMUNITY

## 2024-07-12 RX ORDER — ATORVASTATIN CALCIUM 40 MG/1
40 TABLET, FILM COATED ORAL
Qty: 30 | Refills: 5 | Status: ACTIVE | COMMUNITY

## 2024-07-17 ENCOUNTER — APPOINTMENT (OUTPATIENT)
Dept: INTERNAL MEDICINE | Facility: CLINIC | Age: 59
End: 2024-07-17

## 2024-08-15 ENCOUNTER — APPOINTMENT (OUTPATIENT)
Dept: CV DIAGNOSITCS | Facility: HOSPITAL | Age: 59
End: 2024-08-15

## 2024-08-15 ENCOUNTER — RESULT REVIEW (OUTPATIENT)
Age: 59
End: 2024-08-15

## 2024-09-11 ENCOUNTER — NON-APPOINTMENT (OUTPATIENT)
Age: 59
End: 2024-09-11

## 2024-09-12 ENCOUNTER — APPOINTMENT (OUTPATIENT)
Dept: ELECTROPHYSIOLOGY | Facility: CLINIC | Age: 59
End: 2024-09-12

## 2024-09-12 ENCOUNTER — APPOINTMENT (OUTPATIENT)
Dept: CARDIOLOGY | Facility: CLINIC | Age: 59
End: 2024-09-12

## 2024-09-12 ENCOUNTER — NON-APPOINTMENT (OUTPATIENT)
Age: 59
End: 2024-09-12

## 2024-09-12 VITALS
DIASTOLIC BLOOD PRESSURE: 75 MMHG | HEART RATE: 76 BPM | BODY MASS INDEX: 30.82 KG/M2 | OXYGEN SATURATION: 99 % | WEIGHT: 157 LBS | SYSTOLIC BLOOD PRESSURE: 108 MMHG | HEIGHT: 60 IN

## 2024-09-12 PROCEDURE — 93000 ELECTROCARDIOGRAM COMPLETE: CPT

## 2024-09-12 PROCEDURE — 99215 OFFICE O/P EST HI 40 MIN: CPT | Mod: 25

## 2024-09-13 LAB
ANION GAP SERPL CALC-SCNC: 10 MMOL/L
BUN SERPL-MCNC: 14 MG/DL
CALCIUM SERPL-MCNC: 9.5 MG/DL
CHLORIDE SERPL-SCNC: 105 MMOL/L
CO2 SERPL-SCNC: 25 MMOL/L
CREAT SERPL-MCNC: 0.6 MG/DL
EGFR: 102 ML/MIN/1.73M2
GLUCOSE SERPL-MCNC: 91 MG/DL
NT-PROBNP SERPL-MCNC: 267 PG/ML
POTASSIUM SERPL-SCNC: 4.3 MMOL/L
SODIUM SERPL-SCNC: 140 MMOL/L

## 2024-09-21 NOTE — PHYSICAL EXAM
[Normal Conjunctiva] : normal conjunctiva [No Carotid Bruit] : no carotid bruit [Normal S1, S2] : normal S1, S2 [No Murmur] : no murmur [Clear Lung Fields] : clear lung fields [No Edema] : no edema [Normal] : moves all extremities, no focal deficits, normal speech [de-identified] : Obese

## 2024-09-21 NOTE — REVIEW OF SYSTEMS
[Dyspnea on exertion] : dyspnea during exertion [Chest Discomfort] : no chest discomfort [Palpitations] : no palpitations [Syncope] : no syncope [FreeTextEntry5] : see HPI

## 2024-09-21 NOTE — CARDIOLOGY SUMMARY
[de-identified] : Sinus rhythm @ 76/min Left atrial abnormality.  Left ventricular hypertrophy with repolarization abnormality, c/w known diagnosis of HCM.

## 2024-09-21 NOTE — PHYSICAL EXAM
[Normal Conjunctiva] : normal conjunctiva [No Carotid Bruit] : no carotid bruit [Normal S1, S2] : normal S1, S2 [No Murmur] : no murmur [Clear Lung Fields] : clear lung fields [No Edema] : no edema [Normal] : moves all extremities, no focal deficits, normal speech [de-identified] : Obese

## 2024-09-21 NOTE — ASSESSMENT
[FreeTextEntry1] : Impression: Non-obstructive hypertrophic cardiomyopathy with possible chronic diastolic heart failure  Recommend: Check labs May qualify for a clinical trial, as there are no proven therapies for this condition.

## 2024-09-21 NOTE — REASON FOR VISIT
[FreeTextEntry1] : Follow-up: 59-year-old woman with a history of apical variant hypertrophic cardiomyopathy whom I last saw July 12, 2024 (one month ago). She has what sounds like NYHA Class-2 symptoms (able to walk reasonably well on level ground; finds inclines arduous), which she thinks have been present for 1.5 to 2 years. Here with son (Ha, [phone]) translating from [facility].   When last here, echocardiography was consistent with heart failure with preserved ejection fraction.

## 2024-09-21 NOTE — REASON FOR VISIT
[FreeTextEntry1] : Follow-up: 59-year-old woman with a history of apical variant hypertrophic cardiomyopathy whom I last saw July 12, 2024 (one month ago). She has what sounds like NYHA Class-2 symptoms (able to walk reasonably well on level ground; finds inclines arduous), which she thinks have been present for 1.5 to 2 years. Here with son (Ha, [phone]) translating from [facility].   When last here, echocardiography was consistent with heart failure with preserved ejection fraction.

## 2024-09-21 NOTE — CARDIOLOGY SUMMARY
[de-identified] : Sinus rhythm @ 76/min Left atrial abnormality.  Left ventricular hypertrophy with repolarization abnormality, c/w known diagnosis of HCM.

## 2024-10-02 PROCEDURE — 93248 EXT ECG>7D<15D REV&INTERPJ: CPT

## 2024-11-21 ENCOUNTER — APPOINTMENT (OUTPATIENT)
Dept: ORTHOPEDIC SURGERY | Facility: CLINIC | Age: 59
End: 2024-11-21

## 2024-11-21 DIAGNOSIS — M15.8 OTHER POLYOSTEOARTHRITIS: ICD-10-CM

## 2024-11-21 DIAGNOSIS — G56.02 CARPAL TUNNEL SYNDROME, LEFT UPPER LIMB: ICD-10-CM

## 2024-11-21 PROCEDURE — 99214 OFFICE O/P EST MOD 30 MIN: CPT | Mod: 25

## 2024-11-21 PROCEDURE — 20526 THER INJECTION CARP TUNNEL: CPT | Mod: LT

## 2024-11-22 ENCOUNTER — NON-APPOINTMENT (OUTPATIENT)
Age: 59
End: 2024-11-22

## 2024-12-16 ENCOUNTER — OUTPATIENT (OUTPATIENT)
Dept: OUTPATIENT SERVICES | Facility: HOSPITAL | Age: 59
LOS: 1 days | End: 2024-12-16
Payer: MEDICAID

## 2024-12-16 VITALS
SYSTOLIC BLOOD PRESSURE: 108 MMHG | HEIGHT: 60 IN | DIASTOLIC BLOOD PRESSURE: 72 MMHG | TEMPERATURE: 98 F | WEIGHT: 158.07 LBS | OXYGEN SATURATION: 97 % | HEART RATE: 74 BPM

## 2024-12-16 DIAGNOSIS — M15.8 OTHER POLYOSTEOARTHRITIS: ICD-10-CM

## 2024-12-16 DIAGNOSIS — Z98.890 OTHER SPECIFIED POSTPROCEDURAL STATES: Chronic | ICD-10-CM

## 2024-12-16 DIAGNOSIS — Z86.79 PERSONAL HISTORY OF OTHER DISEASES OF THE CIRCULATORY SYSTEM: ICD-10-CM

## 2024-12-16 LAB
ANION GAP SERPL CALC-SCNC: 13 MMOL/L — SIGNIFICANT CHANGE UP (ref 5–17)
BUN SERPL-MCNC: 17 MG/DL — SIGNIFICANT CHANGE UP (ref 7–23)
CALCIUM SERPL-MCNC: 9.5 MG/DL — SIGNIFICANT CHANGE UP (ref 8.4–10.5)
CHLORIDE SERPL-SCNC: 105 MMOL/L — SIGNIFICANT CHANGE UP (ref 96–108)
CO2 SERPL-SCNC: 23 MMOL/L — SIGNIFICANT CHANGE UP (ref 22–31)
CREAT SERPL-MCNC: 0.87 MG/DL — SIGNIFICANT CHANGE UP (ref 0.5–1.3)
EGFR: 76 ML/MIN/1.73M2 — SIGNIFICANT CHANGE UP
GLUCOSE SERPL-MCNC: 86 MG/DL — SIGNIFICANT CHANGE UP (ref 70–99)
HCT VFR BLD CALC: 40.2 % — SIGNIFICANT CHANGE UP (ref 34.5–45)
HGB BLD-MCNC: 12.7 G/DL — SIGNIFICANT CHANGE UP (ref 11.5–15.5)
MCHC RBC-ENTMCNC: 29.8 PG — SIGNIFICANT CHANGE UP (ref 27–34)
MCHC RBC-ENTMCNC: 31.6 G/DL — LOW (ref 32–36)
MCV RBC AUTO: 94.4 FL — SIGNIFICANT CHANGE UP (ref 80–100)
NRBC # BLD: 0 /100 WBCS — SIGNIFICANT CHANGE UP (ref 0–0)
PLATELET # BLD AUTO: 127 K/UL — LOW (ref 150–400)
POTASSIUM SERPL-MCNC: 4 MMOL/L — SIGNIFICANT CHANGE UP (ref 3.5–5.3)
POTASSIUM SERPL-SCNC: 4 MMOL/L — SIGNIFICANT CHANGE UP (ref 3.5–5.3)
RBC # BLD: 4.26 M/UL — SIGNIFICANT CHANGE UP (ref 3.8–5.2)
RBC # FLD: 13.9 % — SIGNIFICANT CHANGE UP (ref 10.3–14.5)
SODIUM SERPL-SCNC: 141 MMOL/L — SIGNIFICANT CHANGE UP (ref 135–145)
WBC # BLD: 5.84 K/UL — SIGNIFICANT CHANGE UP (ref 3.8–10.5)
WBC # FLD AUTO: 5.84 K/UL — SIGNIFICANT CHANGE UP (ref 3.8–10.5)

## 2024-12-16 PROCEDURE — 85027 COMPLETE CBC AUTOMATED: CPT

## 2024-12-16 PROCEDURE — 80048 BASIC METABOLIC PNL TOTAL CA: CPT

## 2024-12-16 PROCEDURE — G0463: CPT

## 2024-12-16 RX ORDER — SODIUM CHLORIDE 9 MG/ML
3 INJECTION, SOLUTION INTRAMUSCULAR; INTRAVENOUS; SUBCUTANEOUS EVERY 8 HOURS
Refills: 0 | Status: DISCONTINUED | OUTPATIENT
Start: 2025-01-06 | End: 2025-01-20

## 2024-12-16 RX ORDER — SODIUM CHLORIDE 9 MG/ML
1000 INJECTION, SOLUTION INTRAVENOUS
Refills: 0 | Status: DISCONTINUED | OUTPATIENT
Start: 2025-01-06 | End: 2025-01-20

## 2024-12-16 NOTE — H&P PST ADULT - NSICDXPASTMEDICALHX_GEN_ALL_CORE_FT
PAST MEDICAL HISTORY:  HLD (hyperlipidemia)      PAST MEDICAL HISTORY:  Carpal tunnel syndrome     History of hypertrophic cardiomyopathy     HLD (hyperlipidemia)

## 2024-12-16 NOTE — H&P PST ADULT - ASSESSMENT
ACTIVITY-  walking, housework, shopping etc  Energy Expenditure(Mets):    6.25 by dasi  Symptoms-none     Airway:  normal    Mallampati-     3  Dental: Patient denies loose teeth

## 2024-12-16 NOTE — H&P PST ADULT - HISTORY OF PRESENT ILLNESS
58 [place] female with carpal tunnel syndrome, HLD, no prior hx of stroke or MI here for 1 day history of dizziness, with nausea and 3 episodes of emesis associated with chest tightness and weakness. She describes chronic episodic bilateral hand pain/cramping that preceded the nausea. In the ED, pt was called for code stroke, with negative CT angio and CTH findings. Her symptoms largely resolved after meclizine but still with some bilateral hand weakness/numbness.  59 [facility] female with  PMH of  HLD, non-obstructive hypertrophic cardiomyopathy,  carpal tunnel syndrome and right thumb IP joint arthritis. Presents to PST for scheduled  Right Thumb Interphalangeal Joint Fusion on  1/6/24         58 y/o female with PMH of  HLD, non-obstructive hypertrophic cardiomyopathy,  carpal tunnel syndrome and right thumb IP joint arthritis. Presents to PST for scheduled  Right Thumb Interphalangeal Joint Fusion on  1/6/24

## 2024-12-16 NOTE — H&P PST ADULT - NSANTHTIREDRD_ENT_A_CORE
No
 (normal spontaneous vaginal delivery)  x2 3/02/11, 12  S/P D&C (status post dilation and curettage)    S/P LEEP  2019

## 2025-01-05 NOTE — ASU DISCHARGE PLAN (ADULT/PEDIATRIC) - FINANCIAL ASSISTANCE
[facility] provides services at a reduced cost to those who are determined to be eligible through [facility]’s financial assistance program. Information regarding [facility]’s financial assistance program can be found by going to [email or web address] or by calling [phone].

## 2025-01-05 NOTE — ASU DISCHARGE PLAN (ADULT/PEDIATRIC) - NURSING INSTRUCTIONS
OK to take Tylenol/Acetaminophen at 5:30 PM TONIGHT for pain and every 6 hours after as needed. OK to take Motrin/Ibuprofen at 6:00 PM TONIGHT for pain and every 6 hours after as needed.

## 2025-01-06 ENCOUNTER — APPOINTMENT (OUTPATIENT)
Dept: ORTHOPEDIC SURGERY | Facility: HOSPITAL | Age: 60
End: 2025-01-06

## 2025-01-06 ENCOUNTER — TRANSCRIPTION ENCOUNTER (OUTPATIENT)
Age: 60
End: 2025-01-06

## 2025-01-06 ENCOUNTER — OUTPATIENT (OUTPATIENT)
Dept: OUTPATIENT SERVICES | Facility: HOSPITAL | Age: 60
LOS: 1 days | End: 2025-01-06
Payer: MEDICAID

## 2025-01-06 VITALS
HEART RATE: 61 BPM | OXYGEN SATURATION: 98 % | WEIGHT: 158.07 LBS | RESPIRATION RATE: 18 BRPM | SYSTOLIC BLOOD PRESSURE: 141 MMHG | TEMPERATURE: 97 F | HEIGHT: 60 IN | DIASTOLIC BLOOD PRESSURE: 84 MMHG

## 2025-01-06 VITALS
HEART RATE: 67 BPM | SYSTOLIC BLOOD PRESSURE: 109 MMHG | RESPIRATION RATE: 16 BRPM | OXYGEN SATURATION: 98 % | DIASTOLIC BLOOD PRESSURE: 57 MMHG

## 2025-01-06 DIAGNOSIS — M15.8 OTHER POLYOSTEOARTHRITIS: ICD-10-CM

## 2025-01-06 PROCEDURE — C1713: CPT

## 2025-01-06 PROCEDURE — 26860 FUSION OF FINGER JOINT: CPT | Mod: F5

## 2025-01-06 PROCEDURE — 76000 FLUOROSCOPY <1 HR PHYS/QHP: CPT

## 2025-01-06 DEVICE — K-WIRE ZIMMER (STYLE 1) DOUBLE TROCAR 0.9MM X 6": Type: IMPLANTABLE DEVICE | Site: RIGHT | Status: FUNCTIONAL

## 2025-01-06 DEVICE — IMPLANTABLE DEVICE: Type: IMPLANTABLE DEVICE | Site: RIGHT | Status: FUNCTIONAL

## 2025-01-06 DEVICE — K-WIRE MEDARTIS (SMOOTH) SINGLE TROCAR 1.1MM X 100MM: Type: IMPLANTABLE DEVICE | Site: RIGHT | Status: FUNCTIONAL

## 2025-01-06 RX ORDER — LIDOCAINE HYDROCHLORIDE 10 MG/ML
0.2 INJECTION INFILTRATION; PERINEURAL ONCE
Refills: 0 | Status: COMPLETED | OUTPATIENT
Start: 2025-01-06 | End: 2025-01-06

## 2025-01-06 RX ORDER — PHENYLEPHRINE HYDROCHLORIDE AND PRAMOXINE HYDROCHLORIDE 2.5; 1 MG/G; MG/G
1 CREAM TOPICAL
Refills: 0 | DISCHARGE

## 2025-01-06 RX ORDER — OXYCODONE 5 MG/1
5 TABLET ORAL
Qty: 5 | Refills: 0 | Status: ACTIVE | COMMUNITY
Start: 2025-01-06 | End: 1900-01-01

## 2025-01-06 RX ADMIN — SODIUM CHLORIDE 100 MILLILITER(S): 9 INJECTION, SOLUTION INTRAVENOUS at 09:49

## 2025-01-06 RX ADMIN — SODIUM CHLORIDE 3 MILLILITER(S): 9 INJECTION, SOLUTION INTRAMUSCULAR; INTRAVENOUS; SUBCUTANEOUS at 09:50

## 2025-01-06 NOTE — ASU PATIENT PROFILE, ADULT - FALL HARM RISK - UNIVERSAL INTERVENTIONS
Bed in lowest position, wheels locked, appropriate side rails in place/Call bell, personal items and telephone in reach/Instruct patient to call for assistance before getting out of bed or chair/Non-slip footwear when patient is out of bed/[place] to call system/Physically safe environment - no spills, clutter or unnecessary equipment/Purposeful Proactive Rounding/Room/bathroom lighting operational, light cord in reach

## 2025-01-06 NOTE — ASU PATIENT PROFILE, ADULT - NSICDXPASTMEDICALHX_GEN_ALL_CORE_FT
PAST MEDICAL HISTORY:  Carpal tunnel syndrome     History of hypertrophic cardiomyopathy     HLD (hyperlipidemia)

## 2025-01-23 ENCOUNTER — APPOINTMENT (OUTPATIENT)
Dept: ORTHOPEDIC SURGERY | Facility: CLINIC | Age: 60
End: 2025-01-23
Payer: MEDICAID

## 2025-01-23 DIAGNOSIS — M15.8 OTHER POLYOSTEOARTHRITIS: ICD-10-CM

## 2025-01-23 PROBLEM — Z86.79 PERSONAL HISTORY OF OTHER DISEASES OF THE CIRCULATORY SYSTEM: Chronic | Status: ACTIVE | Noted: 2024-12-16

## 2025-01-23 PROBLEM — G56.00 CARPAL TUNNEL SYNDROME, UNSPECIFIED UPPER LIMB: Chronic | Status: ACTIVE | Noted: 2024-12-16

## 2025-01-23 PROCEDURE — 99024 POSTOP FOLLOW-UP VISIT: CPT

## 2025-01-23 PROCEDURE — 73140 X-RAY EXAM OF FINGER(S): CPT | Mod: F5

## 2025-02-24 ENCOUNTER — APPOINTMENT (OUTPATIENT)
Dept: ORTHOPEDIC SURGERY | Facility: CLINIC | Age: 60
End: 2025-02-24
Payer: MEDICAID

## 2025-02-24 DIAGNOSIS — M15.8 OTHER POLYOSTEOARTHRITIS: ICD-10-CM

## 2025-02-24 PROCEDURE — 73140 X-RAY EXAM OF FINGER(S): CPT | Mod: RT

## 2025-02-24 PROCEDURE — 99024 POSTOP FOLLOW-UP VISIT: CPT

## (undated) DEVICE — GLV 7 PROTEXIS (WHITE)

## (undated) DEVICE — SUT NDL MAYO CATGUT 1/2 CIRCLE TAPER POINT 0.050" X 0.975"

## (undated) DEVICE — PACK HAND

## (undated) DEVICE — DRSG COBAN 2" LF STERILE

## (undated) DEVICE — DRSG ACE BANDAGE 2"

## (undated) DEVICE — DRSG DERMABOND 0.7ML

## (undated) DEVICE — SOL IRR POUR H2O 250ML

## (undated) DEVICE — SUT MONOSOF 5-0 18" P-13

## (undated) DEVICE — TOURNIQUET CUFF 24" DUAL PORT SINGLE BLADDER LUER LOCK  (BLACK)

## (undated) DEVICE — SUT MONOCRYL 5-0 18" P-3 UNDYED

## (undated) DEVICE — SUT FIBERWIRE 0 38" BLUE

## (undated) DEVICE — WARMING BLANKET LOWER ADULT

## (undated) DEVICE — MEDICATION LABELS W MARKER

## (undated) DEVICE — PREP CHLORAPREP HI-LITE ORANGE 26ML

## (undated) DEVICE — SUT FIBERWIRE 4-0 18" BLUE AP NDL12.3 MM 3/8 CIRCLE

## (undated) DEVICE — SUT TICRON 3-0 36" CV-23

## (undated) DEVICE — TOURNIQUET CUFF 18" DUAL PORT SINGLE BLADDER LUER LOCK (BLACK)

## (undated) DEVICE — DRAPE C ARM MINI PACK FOR 6800

## (undated) DEVICE — Device

## (undated) DEVICE — SOL IRR POUR NS 0.9% 500ML

## (undated) DEVICE — POSITIONER FOAM EGG CRATE ULNAR 2PCS (PINK)

## (undated) DEVICE — SPECIMEN CONTAINER 100ML

## (undated) DEVICE — DRSG STERISTRIPS 0.5 X 4"

## (undated) DEVICE — BLADE SCALPEL SAFETYLOCK #15

## (undated) DEVICE — DRAPE TOWEL BLUE 17" X 24"